# Patient Record
Sex: MALE | Race: WHITE | NOT HISPANIC OR LATINO | Employment: OTHER | URBAN - METROPOLITAN AREA
[De-identification: names, ages, dates, MRNs, and addresses within clinical notes are randomized per-mention and may not be internally consistent; named-entity substitution may affect disease eponyms.]

---

## 2017-01-03 ENCOUNTER — THERAPY VISIT (OUTPATIENT)
Dept: PHYSICAL THERAPY | Facility: CLINIC | Age: 72
End: 2017-01-03
Payer: COMMERCIAL

## 2017-01-03 DIAGNOSIS — G89.29 CHRONIC PAIN OF BOTH SHOULDERS: ICD-10-CM

## 2017-01-03 DIAGNOSIS — M25.511 CHRONIC PAIN OF BOTH SHOULDERS: ICD-10-CM

## 2017-01-03 DIAGNOSIS — M54.2 NECK PAIN: Primary | ICD-10-CM

## 2017-01-03 DIAGNOSIS — M25.512 CHRONIC PAIN OF BOTH SHOULDERS: ICD-10-CM

## 2017-01-03 PROCEDURE — 97110 THERAPEUTIC EXERCISES: CPT | Mod: GP | Performed by: PHYSICAL THERAPIST

## 2017-01-03 PROCEDURE — 97112 NEUROMUSCULAR REEDUCATION: CPT | Mod: GP | Performed by: PHYSICAL THERAPIST

## 2017-01-11 ENCOUNTER — THERAPY VISIT (OUTPATIENT)
Dept: PHYSICAL THERAPY | Facility: CLINIC | Age: 72
End: 2017-01-11
Payer: COMMERCIAL

## 2017-01-11 DIAGNOSIS — M54.2 NECK PAIN: Primary | ICD-10-CM

## 2017-01-11 DIAGNOSIS — G89.29 CHRONIC PAIN OF BOTH SHOULDERS: ICD-10-CM

## 2017-01-11 DIAGNOSIS — M25.511 CHRONIC PAIN OF BOTH SHOULDERS: ICD-10-CM

## 2017-01-11 DIAGNOSIS — M25.512 CHRONIC PAIN OF BOTH SHOULDERS: ICD-10-CM

## 2017-01-11 PROCEDURE — 97112 NEUROMUSCULAR REEDUCATION: CPT | Mod: GP | Performed by: PHYSICAL THERAPIST

## 2017-01-11 PROCEDURE — 97110 THERAPEUTIC EXERCISES: CPT | Mod: GP | Performed by: PHYSICAL THERAPIST

## 2017-01-18 ENCOUNTER — THERAPY VISIT (OUTPATIENT)
Dept: PHYSICAL THERAPY | Facility: CLINIC | Age: 72
End: 2017-01-18
Payer: COMMERCIAL

## 2017-01-18 DIAGNOSIS — M54.2 NECK PAIN: Primary | ICD-10-CM

## 2017-01-18 DIAGNOSIS — M25.511 CHRONIC PAIN OF BOTH SHOULDERS: ICD-10-CM

## 2017-01-18 DIAGNOSIS — G89.29 CHRONIC PAIN OF BOTH SHOULDERS: ICD-10-CM

## 2017-01-18 DIAGNOSIS — M25.512 CHRONIC PAIN OF BOTH SHOULDERS: ICD-10-CM

## 2017-01-18 PROCEDURE — 97110 THERAPEUTIC EXERCISES: CPT | Mod: GP | Performed by: PHYSICAL THERAPIST

## 2017-01-18 PROCEDURE — 97112 NEUROMUSCULAR REEDUCATION: CPT | Mod: GP | Performed by: PHYSICAL THERAPIST

## 2017-05-09 NOTE — PROGRESS NOTES
James did not return to PT for additional follow up visits.  Current status is unknown.  Discharge at this time.

## 2017-09-14 ENCOUNTER — TRANSFERRED RECORDS (OUTPATIENT)
Dept: PHYSICAL THERAPY | Facility: CLINIC | Age: 72
End: 2017-09-14

## 2017-09-20 ENCOUNTER — THERAPY VISIT (OUTPATIENT)
Dept: PHYSICAL THERAPY | Facility: CLINIC | Age: 72
End: 2017-09-20
Payer: COMMERCIAL

## 2017-09-20 DIAGNOSIS — G89.29 CHRONIC RIGHT SHOULDER PAIN: Primary | ICD-10-CM

## 2017-09-20 DIAGNOSIS — M25.511 CHRONIC RIGHT SHOULDER PAIN: Primary | ICD-10-CM

## 2017-09-20 PROCEDURE — 97112 NEUROMUSCULAR REEDUCATION: CPT | Mod: GP | Performed by: PHYSICAL THERAPIST

## 2017-09-20 PROCEDURE — 97161 PT EVAL LOW COMPLEX 20 MIN: CPT | Mod: GP | Performed by: PHYSICAL THERAPIST

## 2017-09-20 PROCEDURE — 97110 THERAPEUTIC EXERCISES: CPT | Mod: GP | Performed by: PHYSICAL THERAPIST

## 2017-09-20 PROCEDURE — G8984 CARRY CURRENT STATUS: HCPCS | Mod: GP | Performed by: PHYSICAL THERAPIST

## 2017-09-20 PROCEDURE — G8985 CARRY GOAL STATUS: HCPCS | Mod: GP | Performed by: PHYSICAL THERAPIST

## 2017-09-20 NOTE — MR AVS SNAPSHOT
After Visit Summary   9/20/2017    James Silvestre    MRN: [id number]           Patient Information     Date Of Birth          1945        Visit Information        Provider Department      9/20/2017 9:30 AM Rajni Ha, PT [facility]        Today's Diagnoses     Chronic right shoulder pain    -  1       Follow-ups after your visit        Your next 10 appointments already scheduled     Sep 26, 2017 10:20 AM CDT   CELY Extremity with Rajni Ha PT   [facility] ([facility])    [address]  [facility] [address]   [phone]            Oct 03, 2017 10:20 AM CDT   CELY Extremity with Rajni Ha PT   [facility] ([facility])    [address]  [facility] [address]   [phone]            Oct 10, 2017 10:20 AM CDT   CELY Extremity with Rajni Ha PT   [facility] ([facility])    [address]  [facility] [address]   [phone]              Who to contact     If you have questions or need follow up information about today's clinic visit or your schedule please contact [facility] directly at [phone].  Normal or non-critical lab and imaging results will be communicated to you by MyChart, letter or phone within 4 business days after the clinic has received the results. If you do not hear from us within 7 days, please contact the clinic through MyChart or phone. If you have a critical or abnormal lab result, we will notify you by phone as soon as possible.  Submit refill requests through [employer] or call your pharmacy and they will forward the refill request to us. Please allow 3 business days for your refill to be completed.          Additional Information About Your Visit       "  MyChart Information     [employer] lets you send messages to your doctor, view your test results, renew your prescriptions, schedule appointments and more. To sign up, go to [email or web address] . Click on \"Log in\" on the left side of the screen, which will take you to the Welcome page. Then click on \"Sign up Now\" on the right side of the page.     You will be asked to enter the access code listed below, as well as some personal information. Please follow the directions to create your username and password.     Your access code is: [email or web address]  Expires: 2017 11:42 AM     Your access code will  in 90 days. If you need help or a new code, please call your [place] clinic or [phone].        Care EveryWhere ID     This is your Care EveryWhere ID. This could be used by other organizations to access your [place] medical records  [id number]         Blood Pressure from Last 3 Encounters:   No data found for BP    Weight from Last 3 Encounters:   No data found for Wt              We Performed the Following     HC PT EVAL, LOW COMPLEXITY     CELY INITIAL EVAL REPORT     NEUROMUSCULAR RE-EDUCATION     THERAPEUTIC EXERCISES        Primary Care Provider    None Specified       No primary provider on file.        Equal Access to Services     CHRISTIANO WHITNEY : Gabriella West, cathy chaidez, adelfo thacker, roberto crooks. So [facility] [phone].    ATENCIÓN: Si habla español, tiene a greene disposición servicios gratuitos de asistencia lingüística. Llame al [phone].    We comply with applicable federal civil rights laws and [place] laws. We do not discriminate on the basis of race, color, national origin, age, disability sex, sexual orientation or gender identity.            Thank you!     Thank you for choosing [facility]  for your care. Our goal is always to provide you with excellent care. Hearing back " from our patients is one way we can continue to improve our services. Please take a few minutes to complete the written survey that you may receive in the mail after your visit with us. Thank you!             Your Updated Medication List - Protect others around you: Learn how to safely use, store and throw away your medicines at www.disposemymeds.org.      Notice  As of 9/20/2017 11:42 AM    You have not been prescribed any medications.

## 2017-09-20 NOTE — LETTER
[facility]IC [facility] PHYSICAL THERAPY  [address]  [facility] [id number] [phone]    2017    Re: James Silvestre   :   1945  MRN:  [id number]   REFERRING PHYSICIAN:   David Watts    [facility]  Date of Initial Evaluation:  2017  Visits:  Rxs Used: 1  Reason for Referral:  Chronic right shoulder pain  EVALUATION SUMMARY  [facility] Initial Evaluation  Subjective:  Patient is a 72 year old male presenting with rehab right shoulder hpi. The history is provided by the patient. No  was used.   James Silvestre is a 72 year old male with a right shoulder condition.  Condition occurred with:  Unknown cause.  Condition occurred: for unknown reasons.  This is a chronic condition  James saw Dr. Watts on 17 with c/o R shoulder pain.  He was referred to PT for evaluation and treatment.  James reports that his R shoulder has been bothersome for about 8 months, reports having a pinched nerve in his neck a few months ago which resolved with rowing, treadmill walking and weight lifting.  Pain is currently in upper shoulder and back side of shoulder.  Chief complaints: pain with side sleeping, cannot sleep on R shoulder, pain with reaching, difficulty with putting something on a high shelf, and pushing as to get himself up from chair.  Pt also reports that his thumb and first 2 fingers are going numb, reports B carpal tunnel.  Pt walks with cane in R hand.  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  Pt lost his wife in 2017 and decreased activity following her passing.  .    Patient reports pain:  Posterior, upper trap and scapular area.  Radiates to:  Cervical.  Pain is described as aching and is intermittent and reported as 8/10 (0-8/10).  Associated symptoms:  Loss of strength and painful arc (reaching out to side feels weak). Worse during:  activity-position dependent.  Symptoms are exacerbated by lying on extremity, certain positions, using arm at shoulder level and using arm overhead and relieved by nothing.  Since onset symptoms are gradually worsening.          Pertinent medical history includes:  High blood pressure, sleep disorder/apnea and overweight (pain at rest/night, numbness and tingling).  Medical allergies: no.  Other surgeries include:  None reported.  Current medications:  Heparin/coumadin.  Current occupation is retired.    Primary job tasks include:  Prolonged sitting, operating a machine and lifting (computer work, driving, carrying, pushing/pulling).  Barriers include:  None as reported by the patient.  Red flags:  Bilateral numbness and pain at rest/night.  Objective:  Standing Alignment:    Cervical/Thoracic:  Forward head and thoracic kyphosis increased  Shoulder/UE:  Rounded shoulders  Flexibility/Screens:   Positive screens:  Cervical (decreased R shoulder pain with correction of spine posture)         Shoulder Evaluation:  ROM:  AROM:    Flexion:  Left:  170    Right:  155  Extension: Left: 45Right: 35  Abduction:  Left: 170   Right:  168  External Rotation:  Left:  55    Right:  30  Extension/Internal Rotation:  Left:  T8    Right:  T11    PROM:    Flexion:  Left:  175    Right: 170    Abduction:  Left:  175    Right:  170  Strength:    Flexion: Right:  5/5  Strong/painful     Pain:  +  Extension:  Right: 5/5    Strong/pain free  Pain:  Abduction:  Right: 5/5    Strong/painful    Pain:+  Adduction:  Right: 5/5   Strong/pain free    Pain:  Internal Rotation:  Right: 4/5    Strong/painful    Pain:+++  Elbow Flexion:  Right:5/5   Strong/pain free    Pain:  Elbow Extension:  Right:5/5   Strong/pain free    Pain:  Special Tests:    Right shoulder positive for the following special tests:Impingement (+ Neer, + H-K, + crossover)  Right shoulder negative for the following special tests:Rotator cuff tear  Palpation:    Right shoulder  tenderness present at: Levator and Upper Trap  Right shoulder tenderness not present at:Supraspinatus; Infraspinatus; Teres Minor; Subscapularis or Bicipital Groove       Following repeated passive shoulder extension with OP, pt's painful arc of motion was abolished and ERP reduced from 7/10 to 3/10.  Pt educated on regular performance (10 reps every 2 hours), posture and scapular positioning.  Cervical screen positive in reproduction of upper trap and scapular pain, will contact Dr. Watts for cervical spine orders as necessary.  Also decreased James's cane height as it was 3 notches too tall (above distal wrist crease), decreased shoulder elevation with cane ambulation.    Assessment/Plan:    Patient is a 72 year old male with right side shoulder complaints.    Patient has the following significant findings with corresponding treatment plan.                Diagnosis 1:  R shoulder pain  Pain -  hot/cold therapy, manual therapy, self management, education, directional preference exercise and home program  Decreased ROM/flexibility - manual therapy, therapeutic exercise and home program  Decreased strength - therapeutic exercise, therapeutic activities and home program  Impaired muscle performance - neuro re-education and home program  Decreased function - therapeutic activities and home program  Impaired posture - neuro re-education and home program  Therapy Evaluation Codes:   1) History comprised of:   Personal factors that impact the plan of care:      None.    Comorbidity factors that impact the plan of care are:      None.     Medications impacting care: None.  2) Examination of Body Systems comprised of:   Body structures and functions that impact the plan of care:      Cervical spine and Shoulder.   Activity limitations that impact the plan of care are:      Driving, Dressing, Grasping, Lifting, Sleeping, Laying down and reaching, pushing down.  3) Clinical presentation characteristics  are:   Stable/Uncomplicated.  4) Decision-Making    Low complexity using standardized patient assessment instrument and/or measureable assessment of functional outcome.  Cumulative Therapy Evaluation is: Low complexity.  Previous and current functional limitations:  (See Goal Flow Sheet for this information)    Short term and Long term goals: (See Goal Flow Sheet for this information)   Communication ability:  Patient appears to be able to clearly communicate and understand verbal and written communication and follow directions correctly.  Treatment Explanation - The following has been discussed with the patient:   RX ordered/plan of care  Anticipated outcomes  Possible risks and side effects  This patient would benefit from PT intervention to resume normal activities.   Rehab potential is excellent.  Frequency:  1 X week, once daily  Duration:  for 9 weeks  Discharge Plan:  Achieve all LTG.  Independent in home treatment program.  Reach maximal therapeutic benefit.    Thank you for your referral.    INQUIRIES  Therapist: Rajni Ha DPT  [facility]  [address] [place] [address]. [address]  [facility] [address]  Phone: [phone]  Fax: [phone]

## 2017-09-20 NOTE — PROGRESS NOTES
[facility] Initial Evaluation    Subjective:    Patient is a 72 year old male presenting with rehab right shoulder hpi. The history is provided by the patient. No  was used.   James Silvestre is a 72 year old male with a right shoulder condition.  Condition occurred with:  Unknown cause.  Condition occurred: for unknown reasons.  This is a chronic condition  James saw Dr. Watts on 9/13/17 with c/o R shoulder pain.  He was referred to PT for evaluation and treatment.  James reports that his R shoulder has been bothersome for about 8 months, reports having a pinched nerve in his neck a few months ago which resolved with rowing, treadmill walking and weight lifting.  Pain is currently in upper shoulder and back side of shoulder.  Chief complaints: pain with side sleeping, cannot sleep on R shoulder, pain with reaching, difficulty with putting something on a high shelf, and pushing as to get himself up from chair.  Pt also reports that his thumb and first 2 fingers are going numb, reports B carpal tunnel.  Pt walks with cane in R hand.  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  Pt lost his wife in Feb 2017 and decreased activity following her passing.  .    Patient reports pain:  Posterior, upper trap and scapular area.  Radiates to:  Cervical.  Pain is described as aching and is intermittent and reported as 8/10 (0-8/10).  Associated symptoms:  Loss of strength and painful arc (reaching out to side feels weak). Worse during: activity-position dependent.  Symptoms are exacerbated by lying on extremity, certain positions, using arm at shoulder level and using arm overhead and relieved by nothing.  Since onset symptoms are gradually worsening.          Pertinent medical history includes:  High blood pressure, sleep disorder/apnea and overweight (pain at rest/night, numbness and tingling).  Medical allergies: no.  Other surgeries include:  None reported.  Current  medications:  Heparin/coumadin.  Current occupation is retired.    Primary job tasks include:  Prolonged sitting, operating a machine and lifting (computer work, driving, carrying, pushing/pulling).    Barriers include:  None as reported by the patient.    Red flags:  Bilateral numbness and pain at rest/night.                        Objective:    Standing Alignment:    Cervical/Thoracic:  Forward head and thoracic kyphosis increased  Shoulder/UE:  Rounded shoulders                  Flexibility/Screens:   Positive screens:  Cervical (decreased R shoulder pain with correction of spine posture)                             Shoulder Evaluation:  ROM:  AROM:    Flexion:  Left:  170    Right:  155  Extension: Left: 45Right: 35  Abduction:  Left: 170   Right:  168      External Rotation:  Left:  55    Right:  30            Extension/Internal Rotation:  Left:  T8    Right:  T11    PROM:    Flexion:  Left:  175    Right: 170      Abduction:  Left:  175    Right:  170                          Strength:    Flexion: Right:  5/5  Strong/painful     Pain:  +  Extension:  Right: 5/5    Strong/pain free  Pain:  Abduction:  Right: 5/5    Strong/painful    Pain:+  Adduction:  Right: 5/5   Strong/pain free    Pain:  Internal Rotation:  Right: 4/5    Strong/painful    Pain:+++        Elbow Flexion:  Right:5/5   Strong/pain free    Pain:  Elbow Extension:  Right:5/5   Strong/pain free    Pain:    Special Tests:      Right shoulder positive for the following special tests:Impingement (+ Neer, + H-K, + crossover)  Right shoulder negative for the following special tests:Rotator cuff tear  Palpation:      Right shoulder tenderness present at: Levator and Upper Trap  Right shoulder tenderness not present at:Supraspinatus; Infraspinatus; Teres Minor; Subscapularis or Bicipital Groove                                   Following repeated passive shoulder extension with OP, pt's painful arc of motion was abolished and ERP reduced from 7/10 to  3/10.  Pt educated on regular performance (10 reps every 2 hours), posture and scapular positioning.  Cervical screen positive in reproduction of upper trap and scapular pain, will contact Dr. Watts for cervical spine orders as necessary.  Also decreased James's cane height as it was 3 notches too tall (above distal wrist crease), decreased shoulder elevation with cane ambulation.      General     ROS    Assessment/Plan:      Patient is a 72 year old male with right side shoulder complaints.    Patient has the following significant findings with corresponding treatment plan.                Diagnosis 1:  R shoulder pain  Pain -  hot/cold therapy, manual therapy, self management, education, directional preference exercise and home program  Decreased ROM/flexibility - manual therapy, therapeutic exercise and home program  Decreased strength - therapeutic exercise, therapeutic activities and home program  Impaired muscle performance - neuro re-education and home program  Decreased function - therapeutic activities and home program  Impaired posture - neuro re-education and home program    Therapy Evaluation Codes:   1) History comprised of:   Personal factors that impact the plan of care:      None.    Comorbidity factors that impact the plan of care are:      None.     Medications impacting care: None.  2) Examination of Body Systems comprised of:   Body structures and functions that impact the plan of care:      Cervical spine and Shoulder.   Activity limitations that impact the plan of care are:      Driving, Dressing, Grasping, Lifting, Sleeping, Laying down and reaching, pushing down.  3) Clinical presentation characteristics are:   Stable/Uncomplicated.  4) Decision-Making    Low complexity using standardized patient assessment instrument and/or measureable assessment of functional outcome.  Cumulative Therapy Evaluation is: Low complexity.    Previous and current functional limitations:  (See Goal Flow Sheet for  this information)    Short term and Long term goals: (See Goal Flow Sheet for this information)     Communication ability:  Patient appears to be able to clearly communicate and understand verbal and written communication and follow directions correctly.  Treatment Explanation - The following has been discussed with the patient:   RX ordered/plan of care  Anticipated outcomes  Possible risks and side effects  This patient would benefit from PT intervention to resume normal activities.   Rehab potential is excellent.    Frequency:  1 X week, once daily  Duration:  for 9 weeks  Discharge Plan:  Achieve all LTG.  Independent in home treatment program.  Reach maximal therapeutic benefit.    Please refer to the daily flowsheet for treatment today, total treatment time and time spent performing 1:1 timed codes.

## 2017-09-26 ENCOUNTER — THERAPY VISIT (OUTPATIENT)
Dept: PHYSICAL THERAPY | Facility: CLINIC | Age: 72
End: 2017-09-26
Payer: COMMERCIAL

## 2017-09-26 DIAGNOSIS — G89.29 CHRONIC RIGHT SHOULDER PAIN: ICD-10-CM

## 2017-09-26 DIAGNOSIS — M25.511 CHRONIC RIGHT SHOULDER PAIN: ICD-10-CM

## 2017-09-26 PROCEDURE — 97110 THERAPEUTIC EXERCISES: CPT | Mod: GP | Performed by: PHYSICAL THERAPIST

## 2017-09-26 PROCEDURE — 97140 MANUAL THERAPY 1/> REGIONS: CPT | Mod: GP | Performed by: PHYSICAL THERAPIST

## 2017-10-03 ENCOUNTER — THERAPY VISIT (OUTPATIENT)
Dept: PHYSICAL THERAPY | Facility: CLINIC | Age: 72
End: 2017-10-03
Payer: COMMERCIAL

## 2017-10-03 DIAGNOSIS — M25.511 CHRONIC RIGHT SHOULDER PAIN: ICD-10-CM

## 2017-10-03 DIAGNOSIS — G89.29 CHRONIC RIGHT SHOULDER PAIN: ICD-10-CM

## 2017-10-03 PROCEDURE — 97140 MANUAL THERAPY 1/> REGIONS: CPT | Mod: GP | Performed by: PHYSICAL THERAPIST

## 2017-10-03 PROCEDURE — 97110 THERAPEUTIC EXERCISES: CPT | Mod: GP | Performed by: PHYSICAL THERAPIST

## 2017-10-10 ENCOUNTER — THERAPY VISIT (OUTPATIENT)
Dept: PHYSICAL THERAPY | Facility: CLINIC | Age: 72
End: 2017-10-10
Payer: COMMERCIAL

## 2017-10-10 DIAGNOSIS — G89.29 CHRONIC RIGHT SHOULDER PAIN: ICD-10-CM

## 2017-10-10 DIAGNOSIS — M25.511 CHRONIC RIGHT SHOULDER PAIN: ICD-10-CM

## 2017-10-10 PROCEDURE — 97112 NEUROMUSCULAR REEDUCATION: CPT | Mod: GP | Performed by: PHYSICAL THERAPIST

## 2017-10-10 PROCEDURE — 97110 THERAPEUTIC EXERCISES: CPT | Mod: GP | Performed by: PHYSICAL THERAPIST

## 2017-10-17 ENCOUNTER — THERAPY VISIT (OUTPATIENT)
Dept: PHYSICAL THERAPY | Facility: CLINIC | Age: 72
End: 2017-10-17
Payer: COMMERCIAL

## 2017-10-17 DIAGNOSIS — G89.29 CHRONIC RIGHT SHOULDER PAIN: ICD-10-CM

## 2017-10-17 DIAGNOSIS — M25.511 CHRONIC RIGHT SHOULDER PAIN: ICD-10-CM

## 2017-10-17 PROCEDURE — 97530 THERAPEUTIC ACTIVITIES: CPT | Mod: GP | Performed by: PHYSICAL THERAPIST

## 2017-10-17 PROCEDURE — 97110 THERAPEUTIC EXERCISES: CPT | Mod: GP | Performed by: PHYSICAL THERAPIST

## 2017-10-31 ENCOUNTER — THERAPY VISIT (OUTPATIENT)
Dept: PHYSICAL THERAPY | Facility: CLINIC | Age: 72
End: 2017-10-31
Payer: COMMERCIAL

## 2017-10-31 DIAGNOSIS — G89.29 CHRONIC RIGHT SHOULDER PAIN: ICD-10-CM

## 2017-10-31 DIAGNOSIS — M25.511 CHRONIC RIGHT SHOULDER PAIN: ICD-10-CM

## 2017-10-31 PROCEDURE — 97110 THERAPEUTIC EXERCISES: CPT | Mod: GP | Performed by: PHYSICAL THERAPIST

## 2017-10-31 PROCEDURE — 97112 NEUROMUSCULAR REEDUCATION: CPT | Mod: GP | Performed by: PHYSICAL THERAPIST

## 2017-11-14 ENCOUNTER — THERAPY VISIT (OUTPATIENT)
Dept: PHYSICAL THERAPY | Facility: CLINIC | Age: 72
End: 2017-11-14
Payer: COMMERCIAL

## 2017-11-14 DIAGNOSIS — G89.29 CHRONIC RIGHT SHOULDER PAIN: ICD-10-CM

## 2017-11-14 DIAGNOSIS — M25.511 CHRONIC RIGHT SHOULDER PAIN: ICD-10-CM

## 2017-11-14 PROCEDURE — 97110 THERAPEUTIC EXERCISES: CPT | Mod: GP | Performed by: PHYSICAL THERAPIST

## 2017-11-14 PROCEDURE — 97112 NEUROMUSCULAR REEDUCATION: CPT | Mod: GP | Performed by: PHYSICAL THERAPIST

## 2017-11-21 ENCOUNTER — THERAPY VISIT (OUTPATIENT)
Dept: PHYSICAL THERAPY | Facility: CLINIC | Age: 72
End: 2017-11-21
Payer: COMMERCIAL

## 2017-11-21 DIAGNOSIS — M25.511 CHRONIC RIGHT SHOULDER PAIN: ICD-10-CM

## 2017-11-21 DIAGNOSIS — G89.29 CHRONIC RIGHT SHOULDER PAIN: ICD-10-CM

## 2017-11-21 PROCEDURE — 97112 NEUROMUSCULAR REEDUCATION: CPT | Mod: GP | Performed by: PHYSICAL THERAPIST

## 2017-11-21 PROCEDURE — 97110 THERAPEUTIC EXERCISES: CPT | Mod: GP | Performed by: PHYSICAL THERAPIST

## 2017-12-05 ENCOUNTER — THERAPY VISIT (OUTPATIENT)
Dept: PHYSICAL THERAPY | Facility: CLINIC | Age: 72
End: 2017-12-05
Payer: COMMERCIAL

## 2017-12-05 DIAGNOSIS — G89.29 CHRONIC RIGHT SHOULDER PAIN: ICD-10-CM

## 2017-12-05 DIAGNOSIS — M25.511 CHRONIC RIGHT SHOULDER PAIN: ICD-10-CM

## 2017-12-05 PROCEDURE — 97530 THERAPEUTIC ACTIVITIES: CPT | Mod: GP | Performed by: PHYSICAL THERAPIST

## 2017-12-05 PROCEDURE — 97112 NEUROMUSCULAR REEDUCATION: CPT | Mod: GP | Performed by: PHYSICAL THERAPIST

## 2017-12-05 PROCEDURE — G8985 CARRY GOAL STATUS: HCPCS | Mod: GP | Performed by: PHYSICAL THERAPIST

## 2017-12-05 PROCEDURE — 97110 THERAPEUTIC EXERCISES: CPT | Mod: GP | Performed by: PHYSICAL THERAPIST

## 2017-12-05 PROCEDURE — G8984 CARRY CURRENT STATUS: HCPCS | Mod: GP | Performed by: PHYSICAL THERAPIST

## 2017-12-05 NOTE — LETTER
[facility] ATHLETIC [facility] PHYSICAL THERAPY  [address]  [facility] [place] [facility] [address] [phone]    2017    Re: James Silvestre   :   1945  MRN:  [id number]   REFERRING PHYSICIAN:   David Watts    [facility]THLET[facility] PHYSICAL [facility]    Date of Initial Evaluation:  2017  Visits:  Rxs Used: 9  Reason for Referral:  Chronic right shoulder pain    PROGRESS  REPORT  Progress reporting period is from 17 to 17.       SUBJECTIVE  Subjective: James reports that his shoulder is better since starting therapy. However he continues to have the most pain at night when lying on his R shoulder. Does his neck exercises as well, 2-3x/day. Pain with reaching continues when reaching up/out to the side in certain positions. Trial of medication from family member abolished shoulder pain temporarily. L>R hand numbness and he has been noticing it more in the past week.  Numbness/tingling at night in hands despite wearing braces for carpal tunnel.  Shoulder blade pain is there all the time and began to improve with cervical ex but does not last.  Pt would like to return to MD as he is still having problems, would like to return to PT as directed.      Current pain level is 0/10 at rest, minimal pain with reaching overhead/out to side, however up to 8/10 with reaching behind back.     Initial Pain level:  (0-8/10).   Changes in function:  Yes (See Goal flowsheet attached for changes in current functional level)  Adverse reaction to treatment or activity: Increased pain with sleeping, reaching.     OBJECTIVE  Changes noted in objective findings:  Yes, please see below.   Objective: R shoulder AROM: flex 155, abd 160, ER 68, IR/Ext T10, ext 40.  Up to 8/10 pain with reaching behind back, signifiacntly less pain with reaching out to side and overhead (4/10).  R shoulder strength: 5/5 for flex, abd, add, ext, ER, IR; mild pain with resisted IR and abd.  Cervical AROM: flex full with reproduction of R shoulder blade pain, ext with mod loss and painfree, B lateral flexion with mod loss and painfree, B rotation with min loss and painfree. Reduced pain with reaching behind back (5/10) following repeated cervical retraction x10. Further reduction in pain and increased motion following shoulder extension exericses. At this time, James has a HEP that is complete with posture ex, shoulder mobility ex, scapular stability ex and RC strengthening ex. D/t presence of progressive hand sx and plateau in motion/pain relief, further MD evaluation (including cervical spine) is recommended prior to continuation of PT. Discussed ongoing POC with patient and he is in agreement and would like to discuss progress and plateau and neck/shoulder with Dr. Watts.     Re: James Silvestre   :   1945          ASSESSMENT/PLAN  Updated problem list and treatment plan: Diagnosis 1: R shoulder pain  Pain -  hot/cold therapy, manual therapy, self management, education, directional preference exercise and home program  Decreased ROM/flexibility - manual therapy, therapeutic exercise and home program  Impaired muscle performance - neuro re-education and home program  Decreased function - therapeutic activities and home program  Impaired posture - neuro re-education and home program  STG/LTGs have been met or progress has been made towards goals:  Yes (See Goal flow sheet completed today.)  Assessment of Progress: The patient's condition has potential to improve.  The patient's progress has plateaued.  Self Management Plans:  Patient has been instructed in a home treatment program.  Patient  has been instructed in self management of symptoms.  I have re-evaluated this patient and find that the nature, scope, duration and intensity of the therapy is appropriate for the medical condition of the patient.  James continues to require the following intervention to meet STG and LTG's:  Further evaluation  is recommended of cervical and shoulder region prior to continuation of PT.     Recommendations:  This patient would benefit from further evaluation.  Pt should return to PT as directed by MD following evaluation.      Thank you for your referral.        INQUIRIES  Therapist: Rajni Ha DPT  [facility] [facility] PHYSICAL THERAPY  [address] [place] [address]. [address]  [facility] [address]  Phone: [phone]  Fax: [phone]

## 2017-12-05 NOTE — PROGRESS NOTES
Subjective:    HPI                    Objective:    System    Physical Exam    General     ROS    Assessment/Plan:      PROGRESS  REPORT    Progress reporting period is from 9/20/17 to 12/5/17.       SUBJECTIVE  Subjective: James reports that his shoulder is better since starting therapy.  However he continues to have the most pain at night when lying on his R shoulder.  Does his neck exercises as well, 2-3x/day.  Pain with reaching continues when reaching up/out to the side in certain positions.  Trial of medication from family member abolished shoulder pain temporarily.  L>R hand numbness and he has been noticing it more in the past week.  Numbness/tingling at night in hands despite wearing braces for carpal tunnel.  Shoulder blade pain is there all the time and began to improve with cervical ex but does not last.  Pt would like to return to MD as he is still having problems, would like to return to PT as directed.      Current pain level is 0/10 at rest, minimal pain with reaching overhead/out to side, however up to 8/10 with reaching behind back.     Initial Pain level:  (0-8/10).   Changes in function:  Yes (See Goal flowsheet attached for changes in current functional level)  Adverse reaction to treatment or activity: Increased pain with sleeping, reaching.     OBJECTIVE  Changes noted in objective findings:  Yes, please see below.   Objective: R shoulder AROM: flex 155, abd 160, ER 68, IR/Ext T10, ext 40.  Up to 8/10 pain with reaching behind back, signifiacntly less pain with reaching out to side and overhead (4/10).  R shoulder strength: 5/5 for flex, abd, add, ext, ER, IR; mild pain with resisted IR and abd.  Cervical AROM: flex full with reproduction of R shoulder blade pain, ext with mod loss and painfree, B lateral flexion with mod loss and painfree, B rotation with min loss and painfree.  Reduced pain with reaching behind back (5/10) following repeated cervical retraction x10.  Further reduction in pain  and increased motion following shoulder extension exericses.  At this time, James has a HEP that is complete with posture ex, shoulder mobility ex, scapular stability ex and RC strengthening ex.  D/t presence of progressive hand sx and plateau in motion/pain relief, further MD evaluation (including cervical spine) is recommended prior to continuation of PT.  Discussed ongoing POC with patient and he is in agreement and would like to discuss progress and plateau and neck/shoulder with Dr. Watts.       ASSESSMENT/PLAN  Updated problem list and treatment plan: Diagnosis 1: R shoulder pain  Pain -  hot/cold therapy, manual therapy, self management, education, directional preference exercise and home program  Decreased ROM/flexibility - manual therapy, therapeutic exercise and home program  Impaired muscle performance - neuro re-education and home program  Decreased function - therapeutic activities and home program  Impaired posture - neuro re-education and home program  STG/LTGs have been met or progress has been made towards goals:  Yes (See Goal flow sheet completed today.)  Assessment of Progress: The patient's condition has potential to improve.  The patient's progress has plateaued.  Self Management Plans:  Patient has been instructed in a home treatment program.  Patient  has been instructed in self management of symptoms.  I have re-evaluated this patient and find that the nature, scope, duration and intensity of the therapy is appropriate for the medical condition of the patient.  James continues to require the following intervention to meet STG and LTG's:  Further evaluation is recommended of cervical and shoulder region prior to continuation of PT.     Recommendations:  This patient would benefit from further evaluation.  Pt should return to PT as directed by MD following evaluation.      Please refer to the daily flowsheet for treatment today, total treatment time and time spent performing 1:1 timed  codes.

## 2017-12-05 NOTE — MR AVS SNAPSHOT
"              After Visit Summary   12/5/2017    James Silvestre    MRN: [id number]           Patient Information     Date Of Birth          1945        Visit Information        Provider Department      12/5/2017 10:20 AM Rajni Ha, PT [facility]        Today's Diagnoses     Chronic right shoulder pain           Follow-ups after your visit        Your next 10 appointments already scheduled     Dec 12, 2017 10:20 AM CST   CELY Extremity with Rajni Ha PT   [facility] ([facility])    [address]  [facility] [id number] [phone]            Dec 19, 2017 10:20 AM CST   CELY Extremity with Rajni Ha PT   [facility] ([facility])    [address]  [facility] [id number] [phone]              Who to contact     If you have questions or need follow up information about today's clinic visit or your schedule please contact [facility] directly at [phone].  Normal or non-critical lab and imaging results will be communicated to you by Verdezynehart, letter or phone within 4 business days after the clinic has received the results. If you do not hear from us within 7 days, please contact the clinic through Mogadt or phone. If you have a critical or abnormal lab result, we will notify you by phone as soon as possible.  Submit refill requests through Instinctiv or call your pharmacy and they will forward the refill request to us. Please allow 3 business days for your refill to be completed.          Additional Information About Your Visit        Verdezynehart Information     Instinctiv lets you send messages to your doctor, view your test results, renew your prescriptions, schedule appointments and more. To sign up, go to [email or web address] . Click on \"Log in\" on the left side " "of the screen, which will take you to the Welcome page. Then click on \"Sign up Now\" on the right side of the page.     You will be asked to enter the access code listed below, as well as some personal information. Please follow the directions to create your username and password.     Your access code is: [email or web address]  Expires: 2017 10:42 AM     Your access code will  in 90 days. If you need help or a new code, please call your [facility] clinic or [phone].        Care EveryWhere ID     This is your Care EveryWhere ID. This could be used by other organizations to access your [facility] medical records  [id number]         Blood Pressure from Last 3 Encounters:   No data found for BP    Weight from Last 3 Encounters:   No data found for Wt              We Performed the Following     CELY PROGRESS NOTES REPORT     NEUROMUSCULAR RE-EDUCATION     THERAPEUTIC ACTIVITIES     THERAPEUTIC EXERCISES        Primary Care Provider Office Phone # Fax #    David HEIN [facility] [phone]       [facility] [address] [facility] [address]  [facility] [address]        Equal Access to Services     [facility]: Hadii aad ku hadasho Soomaali, waaxda luqadaha, qaybta kaalmada adeegyaricardo, roberto ellington . So [facility] [phone].    ATENCIÓN: Si habla español, tiene a greene disposición servicios gratuitos de asistencia lingüística. Maximo al [phone].    We comply with applicable federal civil rights laws and [place] laws. We do not discriminate on the basis of race, color, national origin, age, disability, sex, sexual orientation, or gender identity.            Thank you!     Thank you for choosing [facility]  for your care. Our goal is always to provide you with excellent care. Hearing back from our patients is one way we can continue to improve our services. Please take a few minutes to complete the written survey that you may receive in the mail " after your visit with us. Thank you!             Your Updated Medication List - Protect others around you: Learn how to safely use, store and throw away your medicines at www.disposemymeds.org.      Notice  As of 12/5/2017 12:20 PM    You have not been prescribed any medications.

## 2018-01-12 PROBLEM — G89.29 CHRONIC RIGHT SHOULDER PAIN: Status: RESOLVED | Noted: 2017-09-20 | Resolved: 2018-01-12

## 2018-01-12 PROBLEM — M25.511 CHRONIC RIGHT SHOULDER PAIN: Status: RESOLVED | Noted: 2017-09-20 | Resolved: 2018-01-12

## 2018-01-12 NOTE — PROGRESS NOTES
James did not return to PT for additional follow up visits.  Status at PN serves as status at discharge- current status unknown.  Discharge at this time.

## 2019-04-26 ENCOUNTER — TRANSFERRED RECORDS (OUTPATIENT)
Dept: PHYSICAL THERAPY | Facility: CLINIC | Age: 74
End: 2019-04-26

## 2019-05-02 ENCOUNTER — THERAPY VISIT (OUTPATIENT)
Dept: PHYSICAL THERAPY | Facility: CLINIC | Age: 74
End: 2019-05-02
Payer: COMMERCIAL

## 2019-05-02 DIAGNOSIS — S76.311A HAMSTRING MUSCLE STRAIN, RIGHT, INITIAL ENCOUNTER: ICD-10-CM

## 2019-05-02 DIAGNOSIS — M54.16 LUMBAR RADICULOPATHY: ICD-10-CM

## 2019-05-02 PROCEDURE — 97110 THERAPEUTIC EXERCISES: CPT | Mod: GP | Performed by: PHYSICAL THERAPIST

## 2019-05-02 PROCEDURE — 97161 PT EVAL LOW COMPLEX 20 MIN: CPT | Mod: GP | Performed by: PHYSICAL THERAPIST

## 2019-05-02 NOTE — LETTER
[facility] PHYSICAL THERAP[facility]  [address]  [facility] [id number] [phone]    May 2, 2019    Re: James Silvestre   :   1945  MRN:  [id number]   REFERRING PHYSICIAN:   Yolanda Tobin    [facility]    Date of Initial Evaluation:  2019  Visits:  Rxs Used: 1  Reason for Referral:     Lumbar radiculopathy  Hamstring muscle strain, right, initial encounter    EVALUATION SUMMARY    [facility]    Subjective:  James saw Yolanda Tobin PA-C on 19 with c/o R thigh pain.  James reports that his pain started about 1 month ago when working in the garage.  He was doing some overhead work but intermittently kneeling- did not feel pain at the time, but later that day.  His hamstring continues to bother him and when he went went to see the doctor about something else, but he got out of car on 19, walked most of the way to the building and R leg gave out and he fell in the parking lot.  Was referred to PT for evaluation and treatment.  James reports that his pain is the worst after sitting when goes to stand up, gets better after some walking.  Has put heat on leg past couple of nights which feels better, he iced it for first 3 days which helped then too.  Feels that pain was initially worse up higher in his thigh and now the tightness has travelled down to the middle of the back of the thigh.      The history is provided by the patient. No  was used.   Affected Side: R thigh/hamstring. Condition occurred with:  Insidious onset. Condition occurred: for unknown reasons. This is a new condition     Site of Pain: back of R thigh. Radiates to:  Thigh.  Pain is described as aching and cramping and is intermittent and reported as 4/10.  Associated symptoms:  Buckling/giving out, loss of motion/stiffness and loss of strength. Pain is worse during the  day (activity-dependent).  Symptoms are exacerbated by descending stairs, ascending stairs, certain positions, standing, walking, transfers, bending/squatting and kneeling and relieved by heat, ice and rest.  Since onset symptoms are gradually improving. General health as reported by patient is good.  Pertinent medical history includes:  High blood pressure and overweight.  Medical allergies: no.  Other surgeries include:  None reported.  Current medications:  High blood pressure medication.  Current occupation is retired. 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  Barriers include:  None as reported by the patient.  Red flags:  None as reported by the patient.  Objective:  Gait:  Lacking L hip extension  Gait Type:  Antalgic   Assistive Devices:  Cane  Deviations:  Knee:  Knee flexion decr L       Knee Evaluation:  ROM:    Re: James Silvestre   :   1945      Strength:   Extension:  Right: 5/5   Pain:  Flexion:  Right: 5-/5    Pain:+    Palpation:    Right knee tenderness not present at:  Popliteal; Biceps Femoral and Semitendinosus    Curt Lumbar Evaluation  Posture:  Sitting: poor  Standing: poor  Lordosis: WNL  Lateral Shift: yes and right  Correction of Posture: better  Other Observations: Addition of lumbar roll in sitting decreased R HS pain.  L SGIS reproduced R HS pain.   Movement Loss:  Flexion (Flex): min  Extension (EXT): min  Side Glide R (SG R): min  Side Glide L (SG L): min and pain  Test Movements:  EIS: During: decreases  After: better  Mechanical Response: no effect  Repeat EIS: During: decreases  After: better  Mechanical Response: IncROM  Conclusion: derangement (hamstring pain with initial steps of walking reduced from 4/10 to 2/10 following lumbar repeated extension)  Principle of Treatment:  Posture Correction: lumbar roll in sitting  Extension: repeated standing lumbar extension x5-10, every 2 hours or as needed for relief  Other: pt education regarding  differential diagnosis between HS and lumbar spine as source of radiating thigh pain, ok level of activity, ongoing POC, quad weakness causing knee buckling rather than HS weakness  Musculoskeletal:        Legs:    Assessment/Plan:    Patient is a 74 year old male with lumbar and right hamsring complaints.    Patient has the following significant findings with corresponding treatment plan.                Diagnosis 1:  Lumbar radiculopathy, R hamstring pain  Pain -  hot/cold therapy, manual therapy, self management, education, directional preference exercise and home program  Decreased ROM/flexibility - manual therapy, therapeutic exercise and home program  Decreased joint mobility - manual therapy, therapeutic exercise and home program  Re: James Silvestre   :   1945    Decreased strength - therapeutic exercise, therapeutic activities and home program  Impaired balance - neuro re-education, therapeutic activities and home program  Impaired gait - gait training and home program  Impaired muscle performance - neuro re-education and home program  Decreased function - therapeutic activities and home program  Impaired posture - neuro re-education and home program    Therapy Evaluation Codes:   1) History comprised of:   Personal factors that impact the plan of care:      None.    Comorbidity factors that impact the plan of care are:      None.     Medications impacting care: None.  2) Examination of Body Systems comprised of:   Body structures and functions that impact the plan of care:      Lumbar spine and R hamstring.   Activity limitations that impact the plan of care are:      Driving, Sitting, Squatting/kneeling, Stairs, Standing and Walking.  3) Clinical presentation characteristics are:   Stable/Uncomplicated.  4) Decision-Making    Low complexity using standardized patient assessment instrument and/or measureable assessment of functional outcome.  Cumulative Therapy Evaluation is: Low complexity.  Previous  and current functional limitations:  (See Goal Flow Sheet for this information)    Short term and Long term goals: (See Goal Flow Sheet for this information)   Communication ability:  Patient appears to be able to clearly communicate and understand verbal and written communication and follow directions correctly.  Treatment Explanation - The following has been discussed with the patient: RX ordered/plan of care  Anticipated outcomes  Possible risks and side effects  This patient would benefit from PT intervention to resume normal activities.   Rehab potential is excellent.    Frequency:  1 X week, once daily  Duration:  for 6 weeks  Discharge Plan:  Achieve all LTG.  Independent in home treatment program.  Reach maximal therapeutic benefit.    Patient requests that this note be shared with his PCP, Dr. Watts.      Thank you for your referral.      Therapist: Rajni Ha DPT   [facility]  [address] [place] [address]. [address]  [facility] [address]  Phone: [phone]  Fax: [phone]

## 2019-05-02 NOTE — PROGRESS NOTES
[facility] Initial Evaluation  Subjective:  James saw Yolanda Tobin PA-C on 4/26/19 with c/o R thigh pain.  James reports that his pain started about 1 month ago when working in the garage.  He was doing some overhead work but intermittently kneeling- did not feel pain at the time, but later that day.  His hamstring continues to bother him and when he went went to see the doctor about something else, but he got out of car on 4/26/19, walked most of the way to the building and R leg gave out and he fell in the parking lot.  Was referred to PT for evaluation and treatment.  James reports that his pain is the worst after sitting when goes to stand up, gets better after some walking.  Has put heat on leg past couple of nights which feels better, he iced it for first 3 days which helped then too.  Feels that pain was initially worse up higher in his thigh and now the tightness has travelled down to the middle of the back of the thigh.        The history is provided by the patient. No  was used.   Affected Side: R thigh/hamstring.  Condition occurred with:  Insidious onset.  Condition occurred: for unknown reasons.  This is a new condition     Site of Pain: back of R thigh.  Radiates to:  Thigh.  Pain is described as aching and cramping and is intermittent and reported as 4/10.  Associated symptoms:  Buckling/giving out, loss of motion/stiffness and loss of strength. Pain is worse during the day (activity-dependent).  Symptoms are exacerbated by descending stairs, ascending stairs, certain positions, standing, walking, transfers, bending/squatting and kneeling and relieved by heat, ice and rest.  Since onset symptoms are gradually improving.        General health as reported by patient is good.  Pertinent medical history includes:  High blood pressure and overweight.  Medical allergies: no.  Other surgeries include:  None reported.  Current medications:  High blood pressure  medication.  Current occupation is retired.    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    Barriers include:  None as reported by the patient.    Red flags:  None as reported by the patient.                        Objective:    Gait:  Lacking L hip extension  Gait Type:  Antalgic   Assistive Devices:  Cane  Deviations:  Knee:  Knee flexion decr L                                                      Knee Evaluation:  ROM:            Strength:     Extension:  Right: 5/5   Pain:  Flexion:  Right: 5-/5    Pain:+            Palpation:        Right knee tenderness not present at:  Popliteal; Biceps Femoral and Semitendinosus              Curt Lumbar Evaluation    Posture:  Sitting: poor  Standing: poor  Lordosis: WNL  Lateral Shift: yes and right  Correction of Posture: better  Other Observations: Addition of lumbar roll in sitting decreased R HS pain.  L SGIS reproduced R HS pain.   Movement Loss:  Flexion (Flex): min  Extension (EXT): min  Side Glide R (SG R): min  Side Glide L (SG L): min and pain  Test Movements:    EIS: During: decreases  After: better  Mechanical Response: no effect  Repeat EIS: During: decreases  After: better  Mechanical Response: IncROM            Conclusion: derangement (hamstring pain with initial steps of walking reduced from 4/10 to 2/10 following lumbar repeated extension)  Principle of Treatment:  Posture Correction: lumbar roll in sitting    Extension: repeated standing lumbar extension x5-10, every 2 hours or as needed for relief    Other: pt education regarding differential diagnosis between HS and lumbar spine as source of radiating thigh pain, ok level of activity, ongoing POC, quad weakness causing knee buckling rather than HS weakness                                   Musculoskeletal:        Legs:      ROS    Assessment/Plan:    Patient is a 74 year old male with lumbar and right hamsring complaints.    Patient has the following significant findings with  corresponding treatment plan.                Diagnosis 1:  Lumbar radiculopathy, R hamstring pain  Pain -  hot/cold therapy, manual therapy, self management, education, directional preference exercise and home program  Decreased ROM/flexibility - manual therapy, therapeutic exercise and home program  Decreased joint mobility - manual therapy, therapeutic exercise and home program  Decreased strength - therapeutic exercise, therapeutic activities and home program  Impaired balance - neuro re-education, therapeutic activities and home program  Impaired gait - gait training and home program  Impaired muscle performance - neuro re-education and home program  Decreased function - therapeutic activities and home program  Impaired posture - neuro re-education and home program    Therapy Evaluation Codes:   1) History comprised of:   Personal factors that impact the plan of care:      None.    Comorbidity factors that impact the plan of care are:      None.     Medications impacting care: None.  2) Examination of Body Systems comprised of:   Body structures and functions that impact the plan of care:      Lumbar spine and R hamstring.   Activity limitations that impact the plan of care are:      Driving, Sitting, Squatting/kneeling, Stairs, Standing and Walking.  3) Clinical presentation characteristics are:   Stable/Uncomplicated.  4) Decision-Making    Low complexity using standardized patient assessment instrument and/or measureable assessment of functional outcome.  Cumulative Therapy Evaluation is: Low complexity.    Previous and current functional limitations:  (See Goal Flow Sheet for this information)    Short term and Long term goals: (See Goal Flow Sheet for this information)     Communication ability:  Patient appears to be able to clearly communicate and understand verbal and written communication and follow directions correctly.  Treatment Explanation - The following has been discussed with the patient:   RX  ordered/plan of care  Anticipated outcomes  Possible risks and side effects  This patient would benefit from PT intervention to resume normal activities.   Rehab potential is excellent.    Frequency:  1 X week, once daily  Duration:  for 6 weeks  Discharge Plan:  Achieve all LTG.  Independent in home treatment program.  Reach maximal therapeutic benefit.    Please refer to the daily flowsheet for treatment today, total treatment time and time spent performing 1:1 timed codes.     Patient requests that this note be shared with his PCP, Dr. Watts.

## 2019-05-06 ENCOUNTER — TRANSFERRED RECORDS (OUTPATIENT)
Dept: PHYSICAL THERAPY | Facility: CLINIC | Age: 74
End: 2019-05-06

## 2019-05-09 ENCOUNTER — THERAPY VISIT (OUTPATIENT)
Dept: PHYSICAL THERAPY | Facility: CLINIC | Age: 74
End: 2019-05-09
Payer: COMMERCIAL

## 2019-05-09 DIAGNOSIS — S76.311A HAMSTRING MUSCLE STRAIN, RIGHT, INITIAL ENCOUNTER: ICD-10-CM

## 2019-05-09 DIAGNOSIS — M54.16 LUMBAR RADICULOPATHY: ICD-10-CM

## 2019-05-09 PROCEDURE — 97530 THERAPEUTIC ACTIVITIES: CPT | Mod: GP | Performed by: PHYSICAL THERAPIST

## 2019-05-09 PROCEDURE — 97110 THERAPEUTIC EXERCISES: CPT | Mod: GP | Performed by: PHYSICAL THERAPIST

## 2019-05-09 PROCEDURE — 97112 NEUROMUSCULAR REEDUCATION: CPT | Mod: GP | Performed by: PHYSICAL THERAPIST

## 2019-05-16 ENCOUNTER — THERAPY VISIT (OUTPATIENT)
Dept: PHYSICAL THERAPY | Facility: CLINIC | Age: 74
End: 2019-05-16
Payer: COMMERCIAL

## 2019-05-16 DIAGNOSIS — S76.311A HAMSTRING MUSCLE STRAIN, RIGHT, INITIAL ENCOUNTER: ICD-10-CM

## 2019-05-16 DIAGNOSIS — M54.16 LUMBAR RADICULOPATHY: ICD-10-CM

## 2019-05-16 PROCEDURE — 97112 NEUROMUSCULAR REEDUCATION: CPT | Mod: GP | Performed by: PHYSICAL THERAPIST

## 2019-05-16 PROCEDURE — 97110 THERAPEUTIC EXERCISES: CPT | Mod: GP | Performed by: PHYSICAL THERAPIST

## 2019-05-16 PROCEDURE — 97530 THERAPEUTIC ACTIVITIES: CPT | Mod: GP | Performed by: PHYSICAL THERAPIST

## 2019-07-05 ENCOUNTER — THERAPY VISIT (OUTPATIENT)
Dept: PHYSICAL THERAPY | Facility: CLINIC | Age: 74
End: 2019-07-05
Payer: COMMERCIAL

## 2019-07-05 DIAGNOSIS — M25.511 ACUTE PAIN OF RIGHT SHOULDER: ICD-10-CM

## 2019-07-05 DIAGNOSIS — Z47.89 AFTERCARE FOLLOWING SURGERY OF THE MUSCULOSKELETAL SYSTEM: ICD-10-CM

## 2019-07-05 PROCEDURE — 97530 THERAPEUTIC ACTIVITIES: CPT | Mod: GP | Performed by: PHYSICAL THERAPIST

## 2019-07-05 PROCEDURE — 97110 THERAPEUTIC EXERCISES: CPT | Mod: GP | Performed by: PHYSICAL THERAPIST

## 2019-07-05 PROCEDURE — 97161 PT EVAL LOW COMPLEX 20 MIN: CPT | Mod: GP | Performed by: PHYSICAL THERAPIST

## 2019-07-05 NOTE — PROGRESS NOTES
"[facility] Initial Evaluation  Subjective:  James underwent surgery for R shoulder arthroscopic RCR, SAD and DCE on 6/3/19 by Dr. Mccullough.  James was referred to PT for evaluation and treatment.  James reports that since his surgery his recover has been \"not too bad\", awoke in the night with his arm overhead (about 10 days ago) and had a lot of pain when he awoke but it calmed down over the day. Pt has been wearing his sling at home but not using it when he leaves his house because he must use cane in R hand for ambulation with L below the knee amputation (does not use can inside his house).   Pt also reports that he must use R arm to push a little when he gets out of a chair, but \"not much\". (At this point, PT reviewed protocol and current restrictions/sling wear and timeline for active use; pt verbalized understanding).  Chief complaints at this time: difficulty sleeping, unable ot use R arm for reaching, lifting.      The history is provided by the patient. No  was used.   James Silvestre being seen for R shoulder pain.   Problem began 6/3/2019 (surgery). Where condition occurred: for unknown reasons.Problem occurred: insideous onset  and reported as 3/10 on pain scale. General health as reported by patient is good. Pertinent medical history includes:  High blood pressure and overweight.  Medical allergies: none.  Surgeries include:  None.  Current medications:  High blood pressure medication.   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  Pain is described as aching and is intermittent. Pain is worse during the night. Since onset symptoms are gradually improving. Special tests:  MRI. Previous treatment includes surgery. There was moderate improvement following previous treatment.   Patient is retired.   Barriers include:  Lives alone (must use R-handed cane d/t L LE amputation).  Red flags:  None as reported by patient.  Type of problem:  " Right shoulder   Condition occurred with:  Unknown cause. This is a new condition    Patient reports pain:  Anterior and lateral. Radiates to: upper trap. Associated with: clicking. Symptoms are exacerbated by lying on extremity, carrying, certain positions, lifting, using arm at shoulder level, using arm behind back and using arm overhead (unable to perform AROM d/t post-op restrictions) and relieved by rest and bracing/immobilizing.                      Objective:  Standing Alignment:      Shoulder/UE:  Rounded shoulders and protracted scapula R                                       Shoulder Evaluation:  ROM:  AROM:  not assessed (strength not assessed d/t post-op restrictions)  Flexion:  Left:  150      Extension: Left: 50  Abduction:  Left: 158         External Rotation:  Left:  55                Extension/Internal Rotation:  Left:  T8        PROM:    Flexion:  Right: 120      Abduction:  Right:  60       External Rotation:  Right:  30                      Stability Testing:  not assessed      Special Tests:  not assessed      Palpation:  Palpation assessed shoulder: no ttp- inscisions well-healed without s/s of infection.      Mobility Tests:  not assessed                                                 General     ROS    Assessment/Plan:    Patient is a 74 year old male with right side shoulder complaints.    Patient has the following significant findings with corresponding treatment plan.                Diagnosis 1:  R shoulder pain, s/p R SAD, RCR, DCE  Pain -  hot/cold therapy, manual therapy, self management, education, directional preference exercise and home program  Decreased ROM/flexibility - manual therapy, therapeutic exercise and home program  Decreased joint mobility - manual therapy, therapeutic exercise and home program  Decreased strength - therapeutic exercise, therapeutic activities and home program  Impaired muscle performance - neuro re-education and home program  Decreased function -  therapeutic activities and home program  Impaired posture - neuro re-education and home program    Therapy Evaluation Codes:   1) History comprised of:   Personal factors that impact the plan of care:      None.    Comorbidity factors that impact the plan of care are:      L below the knee amputation, requiring use of cane in R hand.     Medications impacting care: None.  2) Examination of Body Systems comprised of:   Body structures and functions that impact the plan of care:      Shoulder.   Activity limitations that impact the plan of care are:      Bathing, Cooking, Driving, Dressing, Lifting, Working, Sleeping and reaching.  3) Clinical presentation characteristics are:   Stable/Uncomplicated.  4) Decision-Making    Low complexity using standardized patient assessment instrument and/or measureable assessment of functional outcome.  Cumulative Therapy Evaluation is: Low complexity.    Previous and current functional limitations:  (See Goal Flow Sheet for this information)    Short term and Long term goals: (See Goal Flow Sheet for this information)     Communication ability:  Patient appears to be able to clearly communicate and understand verbal and written communication and follow directions correctly.  Treatment Explanation - The following has been discussed with the patient:   RX ordered/plan of care  Anticipated outcomes  Possible risks and side effects  This patient would benefit from PT intervention to resume normal activities.   Rehab potential is excellent.    Frequency:  2 X week, once daily  Duration:  for 1 weeks, tapering to 1x/week for 4 weeks (6 visits per MD to start)  Discharge Plan:  Achieve all LTG.  Independent in home treatment program.  Reach maximal therapeutic benefit.    Please refer to the daily flowsheet for treatment today, total treatment time and time spent performing 1:1 timed codes.

## 2019-07-09 ENCOUNTER — THERAPY VISIT (OUTPATIENT)
Dept: PHYSICAL THERAPY | Facility: CLINIC | Age: 74
End: 2019-07-09
Payer: COMMERCIAL

## 2019-07-09 DIAGNOSIS — Z47.89 AFTERCARE FOLLOWING SURGERY OF THE MUSCULOSKELETAL SYSTEM: ICD-10-CM

## 2019-07-09 DIAGNOSIS — M25.511 ACUTE PAIN OF RIGHT SHOULDER: ICD-10-CM

## 2019-07-09 PROCEDURE — 97110 THERAPEUTIC EXERCISES: CPT | Mod: GP | Performed by: PHYSICAL THERAPIST

## 2019-07-22 ENCOUNTER — THERAPY VISIT (OUTPATIENT)
Dept: PHYSICAL THERAPY | Facility: CLINIC | Age: 74
End: 2019-07-22
Payer: COMMERCIAL

## 2019-07-22 DIAGNOSIS — Z47.89 AFTERCARE FOLLOWING SURGERY OF THE MUSCULOSKELETAL SYSTEM: ICD-10-CM

## 2019-07-22 DIAGNOSIS — M25.511 ACUTE PAIN OF RIGHT SHOULDER: ICD-10-CM

## 2019-07-22 PROCEDURE — 97110 THERAPEUTIC EXERCISES: CPT | Mod: GP | Performed by: PHYSICAL THERAPIST

## 2019-07-26 ENCOUNTER — THERAPY VISIT (OUTPATIENT)
Dept: PHYSICAL THERAPY | Facility: CLINIC | Age: 74
End: 2019-07-26
Payer: COMMERCIAL

## 2019-07-26 DIAGNOSIS — M25.511 ACUTE PAIN OF RIGHT SHOULDER: ICD-10-CM

## 2019-07-26 DIAGNOSIS — Z47.89 AFTERCARE FOLLOWING SURGERY OF THE MUSCULOSKELETAL SYSTEM: ICD-10-CM

## 2019-07-26 PROCEDURE — 97110 THERAPEUTIC EXERCISES: CPT | Mod: GP | Performed by: PHYSICAL THERAPIST

## 2019-07-30 ENCOUNTER — THERAPY VISIT (OUTPATIENT)
Dept: PHYSICAL THERAPY | Facility: CLINIC | Age: 74
End: 2019-07-30
Payer: COMMERCIAL

## 2019-07-30 DIAGNOSIS — M25.511 ACUTE PAIN OF RIGHT SHOULDER: ICD-10-CM

## 2019-07-30 DIAGNOSIS — Z47.89 AFTERCARE FOLLOWING SURGERY OF THE MUSCULOSKELETAL SYSTEM: ICD-10-CM

## 2019-07-30 PROCEDURE — 97110 THERAPEUTIC EXERCISES: CPT | Mod: GP | Performed by: PHYSICAL THERAPIST

## 2019-08-08 ENCOUNTER — TELEPHONE (OUTPATIENT)
Dept: PHYSICAL THERAPY | Facility: CLINIC | Age: 74
End: 2019-08-08

## 2019-08-08 ENCOUNTER — THERAPY VISIT (OUTPATIENT)
Dept: PHYSICAL THERAPY | Facility: CLINIC | Age: 74
End: 2019-08-08
Payer: COMMERCIAL

## 2019-08-08 DIAGNOSIS — M25.511 ACUTE PAIN OF RIGHT SHOULDER: ICD-10-CM

## 2019-08-08 DIAGNOSIS — Z47.89 AFTERCARE FOLLOWING SURGERY OF THE MUSCULOSKELETAL SYSTEM: ICD-10-CM

## 2019-08-08 PROCEDURE — 97110 THERAPEUTIC EXERCISES: CPT | Mod: GP | Performed by: PHYSICAL THERAPIST

## 2019-08-08 NOTE — TELEPHONE ENCOUNTER
Called to ask questions- awaiting return call.  1. Can we start strengthening before 12 weeks?  2. When to progress beyond 90 deg abd?  3. When can pt shoot his rifle for target practice?

## 2019-08-08 NOTE — LETTER
"[facility] PHYSICAL THERAPY  [address]  [place] [id number] [phone]    2019    Re: James Silvestre   :   1945  MRN:  [id number]   REFERRING PHYSICIAN:   Yolanda Tobin    [facility]    Date of Initial Evaluation:  19  Visits:  Rxs Used: 6  Reason for Referral:     Acute pain of right shoulder  Aftercare following surgery of the musculoskeletal system    PROGRESS  REPORT    Progress reporting period is from 19 to 19.       SUBJECTIVE   Subjective: James reports that he had a pretty active week, but did less of his PT exercise, but did not get any more pain.  Less busy now.  Pain only occus when reaching in certain directions or with lying on the shoulder- he is still not using it for lifting.  Wants to fire his rifle from a stand for target practice- wondering about \"kick\" to front of shoulder/chest.      Current Pain level: 0.5/10.     Initial Pain level: 3/10.   Changes in function:  Yes (See Goal flowsheet attached for changes in current functional level)  Adverse reaction to treatment or activity: None    OBJECTIVE  Changes noted in objective findings:  Yes, please see below.   Objective: James is doing quite well.  R shoulder AROM: flex 150, abd 90 (current protocol restriction, however pt able to move greater and must be remineded to avoid higher motion), ER 58 (standing) with mild ERP, ext 40, IR/Ext L4.  Supine flexion with wand= 160 + ERP.  James knows his current motions restrictions and abides by them most of the time.  During his recovery, he has required early use of his R shoulder d/t the necessity of walking with a cane in his R hand because of his L lower leg amputation.  At this time, we are awaiting the 12 week point in his protocol to begin formal strengthening, however PT will place a call to Dr. Mccullough to inquire about strenghtening starting next week and " well as firearm use for target practice.        ASSESSMENT/PLAN  Updated problem list and treatment plan: Diagnosis 1:  R shoulder pain, s/p  RCR, SAD, DCE  Pain -  hot/cold therapy, manual therapy, self management, education, directional preference exercise and home program  Decreased ROM/flexibility - manual therapy, therapeutic exercise and home program  Decreased joint mobility - manual therapy, therapeutic exercise and home program  Decreased strength - therapeutic exercise, therapeutic activities and home program  Impaired muscle performance - neuro re-education and home program  Decreased function - therapeutic activities and home program  STG/LTGs have been met or progress has been made towards goals:  Yes (See Goal flow sheet completed today.)  Assessment of Progress: The patient's condition is improving.  Patient is meeting short term goals and is progressing towards long term goals.  Self Management Plans:  Patient has been instructed in a home treatment program.  Patient  has been instructed in self management of symptoms.  I have re-evaluated this patient and find that the nature, scope, duration and intensity of the therapy is appropriate for the medical condition of the patient.  James continues to require the following intervention to meet STG and LTG's:  PT  Re: James Silvestre   :   1945        Recommendations:  This patient would benefit from continued therapy.     Frequency:  1 X week, once daily  Duration:  for 8 weeks    Thank you for your referral.      INQUIRIES  Therapist: Rajni Ha DPT   [facility] PHYSICAL THERAPY  [address] [place] [address]. [address]  [facility] [address]  Phone: [phone]  Fax: [phone]

## 2019-08-08 NOTE — PROGRESS NOTES
"Subjective:  HPI                    Objective:  System    Physical Exam    General     ROS    Assessment/Plan:    PROGRESS  REPORT    Progress reporting period is from 7/5/19 to 8/8/19.       SUBJECTIVE   Subjective: James reports that he had a pretty active week, but did less of his PT exercise, but did not get any more pain.  Less busy now.  Pain only occus when reaching in certain directions or with lying on the shoulder- he is still not using it for lifting.  Wants to fire his rifle from a stand for target practice- wondering about \"kick\" to front of shoulder/chest.      Current Pain level: 0.5/10.     Initial Pain level: 3/10.   Changes in function:  Yes (See Goal flowsheet attached for changes in current functional level)  Adverse reaction to treatment or activity: None    OBJECTIVE  Changes noted in objective findings:  Yes, please see below.   Objective: James is doing quite well.  R shoulder AROM: flex 150, abd 90 (current protocol restriction, however pt able to move greater and must be remineded to avoid higher motion), ER 58 (standing) with mild ERP, ext 40, IR/Ext L4.  Supine flexion with wand= 160 + ERP.  James knows his current motions restrictions and abides by them most of the time.  During his recovery, he has required early use of his R shoulder d/t the necessity of walking with a cane in his R hand because of his L lower leg amputation.  At this time, we are awaiting the 12 week point in his protocol to begin formal strengthening, however PT will place a call to Dr. Mccullough to inquire about strenghtening starting next week and well as firearm use for target practice.        ASSESSMENT/PLAN  Updated problem list and treatment plan: Diagnosis 1:  R shoulder pain, s/p  RCR, SAD, DCE  Pain -  hot/cold therapy, manual therapy, self management, education, directional preference exercise and home program  Decreased ROM/flexibility - manual therapy, therapeutic exercise and home program  Decreased joint " mobility - manual therapy, therapeutic exercise and home program  Decreased strength - therapeutic exercise, therapeutic activities and home program  Impaired muscle performance - neuro re-education and home program  Decreased function - therapeutic activities and home program  STG/LTGs have been met or progress has been made towards goals:  Yes (See Goal flow sheet completed today.)  Assessment of Progress: The patient's condition is improving.  Patient is meeting short term goals and is progressing towards long term goals.  Self Management Plans:  Patient has been instructed in a home treatment program.  Patient  has been instructed in self management of symptoms.  I have re-evaluated this patient and find that the nature, scope, duration and intensity of the therapy is appropriate for the medical condition of the patient.  James continues to require the following intervention to meet STG and LTG's:  PT    Recommendations:  This patient would benefit from continued therapy.     Frequency:  1 X week, once daily  Duration:  for 8 weeks        Please refer to the daily flowsheet for treatment today, total treatment time and time spent performing 1:1 timed codes.

## 2019-08-13 ENCOUNTER — THERAPY VISIT (OUTPATIENT)
Dept: PHYSICAL THERAPY | Facility: CLINIC | Age: 74
End: 2019-08-13
Payer: COMMERCIAL

## 2019-08-13 DIAGNOSIS — Z47.89 AFTERCARE FOLLOWING SURGERY OF THE MUSCULOSKELETAL SYSTEM: ICD-10-CM

## 2019-08-13 DIAGNOSIS — M25.511 ACUTE PAIN OF RIGHT SHOULDER: ICD-10-CM

## 2019-08-13 PROCEDURE — 97112 NEUROMUSCULAR REEDUCATION: CPT | Mod: GP | Performed by: PHYSICAL THERAPIST

## 2019-08-13 PROCEDURE — 97110 THERAPEUTIC EXERCISES: CPT | Mod: GP | Performed by: PHYSICAL THERAPIST

## 2019-08-20 ENCOUNTER — THERAPY VISIT (OUTPATIENT)
Dept: PHYSICAL THERAPY | Facility: CLINIC | Age: 74
End: 2019-08-20
Payer: COMMERCIAL

## 2019-08-20 DIAGNOSIS — Z47.89 AFTERCARE FOLLOWING SURGERY OF THE MUSCULOSKELETAL SYSTEM: ICD-10-CM

## 2019-08-20 DIAGNOSIS — M25.511 ACUTE PAIN OF RIGHT SHOULDER: ICD-10-CM

## 2019-08-20 PROCEDURE — 97112 NEUROMUSCULAR REEDUCATION: CPT | Mod: GP | Performed by: PHYSICAL THERAPIST

## 2019-08-20 PROCEDURE — 97110 THERAPEUTIC EXERCISES: CPT | Mod: GP | Performed by: PHYSICAL THERAPIST

## 2019-08-27 ENCOUNTER — THERAPY VISIT (OUTPATIENT)
Dept: PHYSICAL THERAPY | Facility: CLINIC | Age: 74
End: 2019-08-27
Payer: COMMERCIAL

## 2019-08-27 DIAGNOSIS — M25.511 ACUTE PAIN OF RIGHT SHOULDER: ICD-10-CM

## 2019-08-27 DIAGNOSIS — Z47.89 AFTERCARE FOLLOWING SURGERY OF THE MUSCULOSKELETAL SYSTEM: ICD-10-CM

## 2019-08-27 PROCEDURE — 97112 NEUROMUSCULAR REEDUCATION: CPT | Mod: GP | Performed by: PHYSICAL THERAPIST

## 2019-08-27 PROCEDURE — 97110 THERAPEUTIC EXERCISES: CPT | Mod: GP | Performed by: PHYSICAL THERAPIST

## 2019-09-10 ENCOUNTER — THERAPY VISIT (OUTPATIENT)
Dept: PHYSICAL THERAPY | Facility: CLINIC | Age: 74
End: 2019-09-10
Payer: COMMERCIAL

## 2019-09-10 DIAGNOSIS — Z47.89 AFTERCARE FOLLOWING SURGERY OF THE MUSCULOSKELETAL SYSTEM: ICD-10-CM

## 2019-09-10 DIAGNOSIS — M25.511 ACUTE PAIN OF RIGHT SHOULDER: ICD-10-CM

## 2019-09-10 PROCEDURE — 97112 NEUROMUSCULAR REEDUCATION: CPT | Mod: GP | Performed by: PHYSICAL THERAPIST

## 2019-09-10 PROCEDURE — 97110 THERAPEUTIC EXERCISES: CPT | Mod: GP | Performed by: PHYSICAL THERAPIST

## 2019-09-17 ENCOUNTER — THERAPY VISIT (OUTPATIENT)
Dept: PHYSICAL THERAPY | Facility: CLINIC | Age: 74
End: 2019-09-17
Payer: COMMERCIAL

## 2019-09-17 DIAGNOSIS — Z47.89 AFTERCARE FOLLOWING SURGERY OF THE MUSCULOSKELETAL SYSTEM: ICD-10-CM

## 2019-09-17 DIAGNOSIS — M25.511 ACUTE PAIN OF RIGHT SHOULDER: ICD-10-CM

## 2019-09-17 PROCEDURE — 97140 MANUAL THERAPY 1/> REGIONS: CPT | Mod: GP | Performed by: PHYSICAL THERAPIST

## 2019-09-17 PROCEDURE — 97112 NEUROMUSCULAR REEDUCATION: CPT | Mod: GP | Performed by: PHYSICAL THERAPIST

## 2019-09-17 PROCEDURE — 97110 THERAPEUTIC EXERCISES: CPT | Mod: GP | Performed by: PHYSICAL THERAPIST

## 2019-10-01 ENCOUNTER — THERAPY VISIT (OUTPATIENT)
Dept: PHYSICAL THERAPY | Facility: CLINIC | Age: 74
End: 2019-10-01
Payer: COMMERCIAL

## 2019-10-01 DIAGNOSIS — M25.511 ACUTE PAIN OF RIGHT SHOULDER: ICD-10-CM

## 2019-10-01 DIAGNOSIS — Z47.89 AFTERCARE FOLLOWING SURGERY OF THE MUSCULOSKELETAL SYSTEM: ICD-10-CM

## 2019-10-01 PROCEDURE — 97112 NEUROMUSCULAR REEDUCATION: CPT | Mod: GP | Performed by: PHYSICAL THERAPIST

## 2019-10-01 PROCEDURE — 97110 THERAPEUTIC EXERCISES: CPT | Mod: GP | Performed by: PHYSICAL THERAPIST

## 2019-10-29 ENCOUNTER — THERAPY VISIT (OUTPATIENT)
Dept: PHYSICAL THERAPY | Facility: CLINIC | Age: 74
End: 2019-10-29
Payer: COMMERCIAL

## 2019-10-29 DIAGNOSIS — Z47.89 AFTERCARE FOLLOWING SURGERY OF THE MUSCULOSKELETAL SYSTEM: ICD-10-CM

## 2019-10-29 DIAGNOSIS — M25.511 ACUTE PAIN OF RIGHT SHOULDER: ICD-10-CM

## 2019-10-29 PROCEDURE — 97110 THERAPEUTIC EXERCISES: CPT | Mod: GP | Performed by: PHYSICAL THERAPIST

## 2019-10-29 PROCEDURE — 97140 MANUAL THERAPY 1/> REGIONS: CPT | Mod: GP | Performed by: PHYSICAL THERAPIST

## 2019-10-29 NOTE — LETTER
[facility]  [address]  [facility] [id number] [phone]    2019    Re: James Silvestre   :   1945  MRN:  [id number]   REFERRING PHYSICIAN:   Yolanda Tobin    [facility]    Date of Initial Evaluation:  10/29/2019  Visits:  Rxs Used: 13  Reason for Referral:     Acute pain of right shoulder  Aftercare following surgery of the musculoskeletal system    DISCHARGE REPORT    Progress reporting period is from 19 to 10/29/19.       SUBJECTIVE  Subjective: James returns to PT today for folloup, has not been seen in 1 month.  He reports that after today, he is ready to progress on his own.  James reports that he has some discomfort but is doing everything he would like to do.  He is able to sleep on his R shoulder for 1-2 hours at a time without waking him up (he thinks).      Current pain level is 0.5/10.      Changes in function:  Yes (See Goal flowsheet attached for changes in current functional level)  Adverse reaction to treatment or activity: None    OBJECTIVE  Changes noted in objective findings:  Yes, please see below.   Objective: R shoulder AROM : flex 152 + discomfort at end range, abd 158, ER 55, ext 35, IR/Ext T10.5. R shoulder strength: 5/5 for flex, ext, abd, add, ER and IR.  Mild pain with resisted abduction and ER remains.  Mild pain with passive OP into flexion as well, abolished following gentle GH mobilizations into inferior glide.  At this time, James has been instructed to continue his 4-corner stretch into flexion 1-2x/day and to continue his RC strengthening 2x/week.       ASSESSMENT/PLAN  Updated problem list and treatment plan: Diagnosis 1:  R shoulder pain, s/p  RCR, SAD, DCE  Pain -  home program  Decreased ROM/flexibility - home program  STG/LTGs have been met or progress has been made towards goals:  Yes (See Goal flow sheet completed  today.)  Assessment of Progress: The patient's condition is improving.  The patient has met all of their long term goals.  Self Management Plans:  Patient has been instructed in a home treatment program.  Patient  has been instructed in self management of symptoms.  Re: James Silvestre   :   1945            I have re-evaluated this patient and find that the nature, scope, duration and intensity of the therapy is appropriate for the medical condition of the patient.  James continues to require the following intervention to meet STG and LTG's:  PT intervention is no longer required to meet STG/LTG.    Recommendations:  This patient is ready to be discharged from therapy and continue their home treatment program.    Thank you for your referral.        INQUIRIES  Therapist: Rajni Ha DPT  [facility] - [facility] PHYSICAL THERAPY  [address] [place] [address]. [address]  [facility] [address]  Phone: [phone]  Fax: [phone]

## 2019-10-29 NOTE — PROGRESS NOTES
Subjective:  HPI                    Objective:  System    Physical Exam    General     ROS    Assessment/Plan:    DISCHARGE REPORT    Progress reporting period is from 8/8/19 to 10/29/19.       SUBJECTIVE  Subjective: James returns to PT today for folloup, has not been seen in 1 month.  He reports that after today, he is ready to progress on his own.  James reports that he has some discomfort but is doing everything he would like to do.  He is able to sleep on his R shoulder for 1-2 hours at a time without waking him up (he thinks).      Current pain level is 0.5/10.      Changes in function:  Yes (See Goal flowsheet attached for changes in current functional level)  Adverse reaction to treatment or activity: None    OBJECTIVE  Changes noted in objective findings:  Yes, please see below.   Objective: R shoulder AROM : flex 152 + discomfort at end range, abd 158, ER 55, ext 35, IR/Ext T10.5. R shoulder strength: 5/5 for flex, ext, abd, add, ER and IR.  Mild pain with resisted abduction and ER remains.  Mild pain with passive OP into flexion as well, abolished following gentle GH mobilizations into inferior glide.  At this time, James has been instructed to continue his 4-corner stretch into flexion 1-2x/day and to continue his RC strengthening 2x/week.       ASSESSMENT/PLAN  Updated problem list and treatment plan: Diagnosis 1:  R shoulder pain, s/p  RCR, SAD, DCE  Pain -  home program  Decreased ROM/flexibility - home program  STG/LTGs have been met or progress has been made towards goals:  Yes (See Goal flow sheet completed today.)  Assessment of Progress: The patient's condition is improving.  The patient has met all of their long term goals.  Self Management Plans:  Patient has been instructed in a home treatment program.  Patient  has been instructed in self management of symptoms.  I have re-evaluated this patient and find that the nature, scope, duration and intensity of the therapy is appropriate for the medical  condition of the patient.  James continues to require the following intervention to meet STG and LTG's:  PT intervention is no longer required to meet STG/LTG.    Recommendations:  This patient is ready to be discharged from therapy and continue their home treatment program.    Please refer to the daily flowsheet for treatment today, total treatment time and time spent performing 1:1 timed codes.

## 2020-01-07 PROBLEM — M54.16 LUMBAR RADICULOPATHY: Status: RESOLVED | Noted: 2019-05-02 | Resolved: 2020-01-07

## 2020-01-07 PROBLEM — S76.311A HAMSTRING MUSCLE STRAIN, RIGHT, INITIAL ENCOUNTER: Status: RESOLVED | Noted: 2019-05-02 | Resolved: 2020-01-07

## 2020-01-07 NOTE — PROGRESS NOTES
Discharge Note    James failed to follow up and current status is unknown.  Please see information below for last relevant information on current status.  Patient seen for 3 visits.    SUBJECTIVE  Subjective changes noted by patient:  James reports that his pain has been pretty goo.  Roll behind his back works really well.  Slight irritation this morning from pulling garbage cans up the driveway.  No relief from hip stretch, some relief from standing up and leavning over sink.  R calf feels tight from time to time.  Would like to keep appt for next week and manybe cancel if still doing well.    .  Current pain level is 1.5/10.     Previous pain level was  4/10.   Changes in function:  Yes (See Goal flowsheet attached for changes in current functional level)  Adverse reaction to treatment or activity: None    OBJECTIVE  Changes noted in objective findings: Increase in R HS pain with standing hip flexor stretch (up to 2/10), reduced to 0.5/10 following rep lumbar extension.  Instruct for continued performance of all HEP items.  Gait lacking L knee and hip extension and therfore creating increased lumbar flexion- discussed continue hip flexion stretches.       ASSESSMENT/PLAN  Diagnosis: R HS pain, lumbar radiculopathy   Updated problem list and treatment plan:   unknown at this time  STG/LTGs have been met or progress has been made towards goals:  Yes, please see goal flowsheet for most current information  Assessment of Progress: current status is unknown.    Last current status:     Self Management Plans:  HEP  I have re-evaluated this patient and find that the nature, scope, duration and intensity of the therapy is appropriate for the medical condition of the patient.  James continues to require the following intervention to meet STG and LTG's:  HEP.    Recommendations:  Discharge with current home program.  Patient to follow up with MD as needed.    Please refer to the daily flowsheet for treatment today, total  treatment time and time spent performing 1:1 timed codes.

## 2021-08-18 ENCOUNTER — THERAPY VISIT (OUTPATIENT)
Dept: PHYSICAL THERAPY | Facility: CLINIC | Age: 76
End: 2021-08-18
Payer: COMMERCIAL

## 2021-08-18 DIAGNOSIS — G89.29 CHRONIC PAIN OF RIGHT KNEE: ICD-10-CM

## 2021-08-18 DIAGNOSIS — M25.561 CHRONIC PAIN OF RIGHT KNEE: ICD-10-CM

## 2021-08-18 PROCEDURE — 97161 PT EVAL LOW COMPLEX 20 MIN: CPT | Mod: GP | Performed by: PHYSICAL THERAPIST

## 2021-08-18 PROCEDURE — 97112 NEUROMUSCULAR REEDUCATION: CPT | Mod: GP | Performed by: PHYSICAL THERAPIST

## 2021-08-18 PROCEDURE — 97110 THERAPEUTIC EXERCISES: CPT | Mod: GP | Performed by: PHYSICAL THERAPIST

## 2021-08-18 ASSESSMENT — ACTIVITIES OF DAILY LIVING (ADL)
RISE FROM A CHAIR: ACTIVITY IS FAIRLY DIFFICULT
STIFFNESS: THE SYMPTOM AFFECTS MY ACTIVITY SLIGHTLY
LIMPING: THE SYMPTOM AFFECTS MY ACTIVITY SLIGHTLY
GO DOWN STAIRS: ACTIVITY IS FAIRLY DIFFICULT
KNEE_ACTIVITY_OF_DAILY_LIVING_SCORE: 47.14
SQUAT: ACTIVITY IS VERY DIFFICULT
RAW_SCORE: 33
WALK: ACTIVITY IS SOMEWHAT DIFFICULT
GO UP STAIRS: ACTIVITY IS FAIRLY DIFFICULT
GIVING WAY, BUCKLING OR SHIFTING OF KNEE: THE SYMPTOM AFFECTS MY ACTIVITY SLIGHTLY
HOW_WOULD_YOU_RATE_THE_CURRENT_FUNCTION_OF_YOUR_KNEE_DURING_YOUR_USUAL_DAILY_ACTIVITIES_ON_A_SCALE_FROM_0_TO_100_WITH_100_BEING_YOUR_LEVEL_OF_KNEE_FUNCTION_PRIOR_TO_YOUR_INJURY_AND_0_BEING_THE_INABILITY_TO_PERFORM_ANY_OF_YOUR_USUAL_DAILY_ACTIVITIES?: 50
SWELLING: THE SYMPTOM AFFECTS MY ACTIVITY SLIGHTLY
AS_A_RESULT_OF_YOUR_KNEE_INJURY,_HOW_WOULD_YOU_RATE_YOUR_CURRENT_LEVEL_OF_DAILY_ACTIVITY?: ABNORMAL
STAND: ACTIVITY IS SOMEWHAT DIFFICULT
HOW_WOULD_YOU_RATE_THE_OVERALL_FUNCTION_OF_YOUR_KNEE_DURING_YOUR_USUAL_DAILY_ACTIVITIES?: ABNORMAL
KNEE_ACTIVITY_OF_DAILY_LIVING_SUM: 33
KNEEL ON THE FRONT OF YOUR KNEE: I AM UNABLE TO DO THE ACTIVITY
PAIN: THE SYMPTOM AFFECTS MY ACTIVITY SLIGHTLY
WEAKNESS: THE SYMPTOM AFFECTS MY ACTIVITY SLIGHTLY
SIT WITH YOUR KNEE BENT: ACTIVITY IS FAIRLY DIFFICULT

## 2021-08-18 NOTE — LETTER
[facility]  [address] [address]  [place] [facility] [place] [id number] [phone]    2021    Re: James Silvestre   :   1945  MRN:  [id number]   REFERRING PHYSICIAN:   Elmo CRAWFORD [facility]    Date of Initial Evaluation:  2021  Visits:  Rxs Used: 1  Reason for Referral:  Chronic pain of right knee    EVALUATION SUMMARY    Physical Therapy Initial Evaluation  Subjective:  Pt reports that he has been having R knee pain for about 5 years but it's worsened over the past 2 months. He has had multiple injections that have been somewhat helpful but the visco shot wasn't helpful. The most recent cortisone shot on 21 was helpful but it didn't completely take it away. [facility] signficant for L BKA in  after failed vascular bypass surgeries from him polycythema.   The history is provided by the patient. No  was used.   Therapist Generated HPI Evaluation      Type of problem:  Right knee.  This is a chronic condition.  Condition occurred with:  Degenerative joint disease.  Where condition occurred: for unknown reasons.  Patient reports pain:  Anterior.  Pain is described as aching and is intermittent.  Pain radiates to:  Knee. Pain is the same all the time.  Since onset symptoms are gradually improving (since most recent injection).  Associated symptoms:  Loss of motion/stiffness, loss of strength, edema, buckling/giving out and numbness. Symptoms are exacerbated by bending/squatting, ascending stairs, descending stairs, transfers, walking and standing (standing> 5 min and getting up from a chair)  and relieved by rest.  Special tests included:  X-ray.  Barriers include:  Lives alone, stairs and bathroom/bedroom on second floor.    Patient Health History  Pain is reported as 3/10 on pain scale.  Pertinent medical history includes: high blood pressure, overweight, numbness/tingling,  osteoarthritis and smoking (polycythema vera).   Red flags:  None as reported by patient.   Surgeries include:  Orthopedic surgery and other. Other surgery history details: BKA in 2010 and R RCR in 2017.    Current medications:  High blood pressure medication, pain medication and anti-depressants.    Current occupation is retired.    Objective:  System      Knee Evaluation:  ROM:  Strength wnl knee: fair quad set on R.  AROM  Hyperextension: Left:     Right: 0  Extension: Left:    Right:  4  Flexion: Left:   Right: 128  Ligament Testing:  Not Assessed  Special Tests: Not Assessed  Palpation:    Right knee tenderness present at:  Medial Joint Line  Re: James Silvestre   :   1945        Functional Testing:  : pt struggles to go from sit to stand without use of cane, but when placing the cane in front of him and sitting at the edge of the chair and going nose over toes, he was able to stand without using his R knee behind him and torquing the knee. Pain reduced     Assessment/Plan:    Patient is a 76 year old male with right side knee complaints.    Patient has the following significant findings with corresponding treatment plan.                Diagnosis 1:  R knee OA  Pain -  self management, education and home program  Decreased ROM/flexibility - manual therapy and therapeutic exercise  Decreased joint mobility - manual therapy and therapeutic exercise  Decreased strength - therapeutic exercise and therapeutic activities  Impaired gait - gait training  Impaired muscle performance - neuro re-education  Decreased function - therapeutic activities    Therapy Evaluation Codes:     1) Clinical presentation characteristics are:   Stable/Uncomplicated.  2) Decision-Making    Low complexity using standardized patient assessment instrument and/or measureable assessment of functional outcome.  Cumulative Therapy Evaluation is: Low complexity.  Previous and current functional limitations:  (See Goal Flow Sheet for this  information)    Short term and Long term goals: (See Goal Flow Sheet for this information)   Communication ability:  Patient appears to be able to clearly communicate and understand verbal and written communication and follow directions correctly.  Treatment Explanation - The following has been discussed with the patient:   RX ordered/plan of care  Anticipated outcomes  Possible risks and side effects  This patient would benefit from PT intervention to resume normal activities.   Rehab potential is good.  Frequency:  2 X week, once daily for 1 week then 1x/wk for 4 weeks and 1x/month for 1 month  Duration:  for 9 weeks  Discharge Plan:  Achieve all LTG.  Independent in home treatment program.  Reach maximal therapeutic benefit.    Thank you for your referral.      INQUIRIES  Therapist: Kathya Dc DPT, [id number]  [facility] [address]  Phone: [phone]  Fax: [phone]

## 2021-08-18 NOTE — PROGRESS NOTES
Physical Therapy Initial Evaluation  Subjective:  Pt reports that he has been having R knee pain for about 5 years but it's worsened over the past 2 months. He has had multiple injections that have been somewhat helpful but the visco shot wasn't helpful. The most recent cortisone shot on 8-1-21 was helpful but it didn't completely take it away. [facility] signficant for L BKA in 2010 after failed vascular bypass surgeries from him polycythema.     The history is provided by the patient. No  was used.   Therapist Generated HPI Evaluation         Type of problem:  Right knee.    This is a chronic condition.  Condition occurred with:  Degenerative joint disease.  Where condition occurred: for unknown reasons.  Patient reports pain:  Anterior.  Pain is described as aching and is intermittent.  Pain radiates to:  Knee. Pain is the same all the time.  Since onset symptoms are gradually improving (since most recent injection).  Associated symptoms:  Loss of motion/stiffness, loss of strength, edema, buckling/giving out and numbness. Symptoms are exacerbated by bending/squatting, ascending stairs, descending stairs, transfers, walking and standing (standing> 5 min and getting up from a chair)  and relieved by rest.  Special tests included:  X-ray.    Barriers include:  Lives alone, stairs and bathroom/bedroom on second floor.    Patient Health History         Pain is reported as 3/10 on pain scale.    Pertinent medical history includes: high blood pressure, overweight, numbness/tingling, osteoarthritis and smoking (polycythema vera).   Red flags:  None as reported by patient.     Surgeries include:  Orthopedic surgery and other. Other surgery history details: BKA in 2010 and R RCR in 2017.    Current medications:  High blood pressure medication, pain medication and anti-depressants.    Current occupation is retired.                                       Objective:  System                                                 Knee Evaluation:  ROM:  Strength wnl knee: fair quad set on R.  AROM    Hyperextension: Left:     Right: 0  Extension: Left:    Right:  4  Flexion: Left:   Right: 128          Ligament Testing:  Not Assessed                Special Tests: Not Assessed      Palpation:      Right knee tenderness present at:  Medial Joint Line      Functional Testing:  : pt struggles to go from sit to stand without use of cane, but when placing the cane in front of him and sitting at the edge of the chair and going nose over toes, he was able to stand without using his R knee behind him and torquing the knee. Pain reduced                   General     ROS    Assessment/Plan:    Patient is a 76 year old male with right side knee complaints.    Patient has the following significant findings with corresponding treatment plan.                Diagnosis 1:  R knee OA    Pain -  self management, education and home program  Decreased ROM/flexibility - manual therapy and therapeutic exercise  Decreased joint mobility - manual therapy and therapeutic exercise  Decreased strength - therapeutic exercise and therapeutic activities  Impaired gait - gait training  Impaired muscle performance - neuro re-education  Decreased function - therapeutic activities    Therapy Evaluation Codes:     1) Clinical presentation characteristics are:   Stable/Uncomplicated.  2) Decision-Making    Low complexity using standardized patient assessment instrument and/or measureable assessment of functional outcome.  Cumulative Therapy Evaluation is: Low complexity.    Previous and current functional limitations:  (See Goal Flow Sheet for this information)    Short term and Long term goals: (See Goal Flow Sheet for this information)     Communication ability:  Patient appears to be able to clearly communicate and understand verbal and written communication and follow directions correctly.  Treatment Explanation - The following has been discussed with the patient:    RX ordered/plan of care  Anticipated outcomes  Possible risks and side effects  This patient would benefit from PT intervention to resume normal activities.   Rehab potential is good.    Frequency:  2 X week, once daily for 1 week then 1x/wk for 4 weeks and 1x/month for 1 month  Duration:  for 9 weeks  Discharge Plan:  Achieve all LTG.  Independent in home treatment program.  Reach maximal therapeutic benefit.    Please refer to the daily flowsheet for treatment today, total treatment time and time spent performing 1:1 timed codes.

## 2021-08-20 ENCOUNTER — THERAPY VISIT (OUTPATIENT)
Dept: PHYSICAL THERAPY | Facility: CLINIC | Age: 76
End: 2021-08-20
Payer: COMMERCIAL

## 2021-08-20 DIAGNOSIS — M25.561 CHRONIC PAIN OF RIGHT KNEE: ICD-10-CM

## 2021-08-20 DIAGNOSIS — G89.29 CHRONIC PAIN OF RIGHT KNEE: ICD-10-CM

## 2021-08-20 PROCEDURE — 97530 THERAPEUTIC ACTIVITIES: CPT | Mod: GP | Performed by: PHYSICAL THERAPIST

## 2021-08-20 PROCEDURE — 97110 THERAPEUTIC EXERCISES: CPT | Mod: GP | Performed by: PHYSICAL THERAPIST

## 2021-08-25 ENCOUNTER — THERAPY VISIT (OUTPATIENT)
Dept: PHYSICAL THERAPY | Facility: CLINIC | Age: 76
End: 2021-08-25
Payer: COMMERCIAL

## 2021-08-25 DIAGNOSIS — G89.29 CHRONIC PAIN OF RIGHT KNEE: ICD-10-CM

## 2021-08-25 DIAGNOSIS — M25.561 CHRONIC PAIN OF RIGHT KNEE: ICD-10-CM

## 2021-08-25 PROCEDURE — 97530 THERAPEUTIC ACTIVITIES: CPT | Mod: GP | Performed by: PHYSICAL THERAPIST

## 2021-08-25 PROCEDURE — 97110 THERAPEUTIC EXERCISES: CPT | Mod: GP | Performed by: PHYSICAL THERAPIST

## 2021-08-25 NOTE — PROGRESS NOTES
"Subjective:  HPI  Physical Exam                    Objective:  System    Physical Exam    General     ROS    Assessment/Plan:    SUBJECTIVE  Subjective changes as noted by pt:  Pt reports he has been consistent with his HEP and his knee pain is less, however he struggles to perform his sit to stand consistently correct.       Current pain level: 1/10     Changes in function:  Yes (See Goal flowsheet attached for changes in current functional level)     Adverse reaction to treatment or activity:  None    OBJECTIVE  Changes in objective findings:  Yes,     Pt requires continual cuing for proper sit to stand transfers ( feet wide, even and without his R behind him, scoot to the edge of the chair and nose over toes to stand up.)    We tried a variety of different situations, high and low arm rests, no arm rests, 1 arm rest and cane and cane only. All were satisfactory with the exeption of cane only--he twists way too much at his R knee and struggle to get upright and trust his cane. Pt felt comfortable with the strategies provided and now just needs to \"practice\"     He is safest and can negotiate stairs best by shifting weight slightly forward and stepping up reciprocally with 2 railings. To minimize pain and torsion to his R hip and back, it's recommended for him to step 2 feet per step down sideways leading with his R.         ASSESSMENT  James continues to require intervention to meet STG and LTG's: PT  Patient's symptoms are resolving.  Patient is progressing as expected.  Response to therapy has shown an improvement in  strength  Progress made towards STG/LTG?  Yes (See Goal flowsheet attached for updates on achievement of STG and LTG)    PLAN  Current treatment program is being advanced to more complex exercises.    PTA/ATC plan:  N/A    Please refer to the daily flowsheet for treatment today, total treatment time and time spent performing 1:1 timed codes.        "

## 2021-09-02 ENCOUNTER — THERAPY VISIT (OUTPATIENT)
Dept: PHYSICAL THERAPY | Facility: CLINIC | Age: 76
End: 2021-09-02
Payer: COMMERCIAL

## 2021-09-02 DIAGNOSIS — G89.29 CHRONIC PAIN OF RIGHT KNEE: ICD-10-CM

## 2021-09-02 DIAGNOSIS — M25.561 CHRONIC PAIN OF RIGHT KNEE: ICD-10-CM

## 2021-09-02 PROCEDURE — 97530 THERAPEUTIC ACTIVITIES: CPT | Mod: GP | Performed by: PHYSICAL THERAPIST

## 2021-09-02 PROCEDURE — 97110 THERAPEUTIC EXERCISES: CPT | Mod: GP | Performed by: PHYSICAL THERAPIST

## 2021-09-15 ENCOUNTER — THERAPY VISIT (OUTPATIENT)
Dept: PHYSICAL THERAPY | Facility: CLINIC | Age: 76
End: 2021-09-15
Payer: COMMERCIAL

## 2021-09-15 DIAGNOSIS — G89.29 CHRONIC PAIN OF RIGHT KNEE: ICD-10-CM

## 2021-09-15 DIAGNOSIS — M25.561 CHRONIC PAIN OF RIGHT KNEE: ICD-10-CM

## 2021-09-15 PROCEDURE — 97110 THERAPEUTIC EXERCISES: CPT | Mod: GP | Performed by: PHYSICAL THERAPIST

## 2021-09-15 PROCEDURE — 97530 THERAPEUTIC ACTIVITIES: CPT | Mod: GP | Performed by: PHYSICAL THERAPIST

## 2021-09-15 ASSESSMENT — ACTIVITIES OF DAILY LIVING (ADL)
SIT WITH YOUR KNEE BENT: ACTIVITY IS NOT DIFFICULT
AS_A_RESULT_OF_YOUR_KNEE_INJURY,_HOW_WOULD_YOU_RATE_YOUR_CURRENT_LEVEL_OF_DAILY_ACTIVITY?: NEARLY NORMAL
SQUAT: ACTIVITY IS NOT DIFFICULT
RISE FROM A CHAIR: ACTIVITY IS FAIRLY DIFFICULT
KNEEL ON THE FRONT OF YOUR KNEE: ACTIVITY IS MINIMALLY DIFFICULT
LIMPING: THE SYMPTOM AFFECTS MY ACTIVITY MODERATELY
PAIN: THE SYMPTOM AFFECTS MY ACTIVITY SLIGHTLY
WEAKNESS: THE SYMPTOM AFFECTS MY ACTIVITY MODERATELY
KNEE_ACTIVITY_OF_DAILY_LIVING_SCORE: 65.71
RAW_SCORE: 46
HOW_WOULD_YOU_RATE_THE_CURRENT_FUNCTION_OF_YOUR_KNEE_DURING_YOUR_USUAL_DAILY_ACTIVITIES_ON_A_SCALE_FROM_0_TO_100_WITH_100_BEING_YOUR_LEVEL_OF_KNEE_FUNCTION_PRIOR_TO_YOUR_INJURY_AND_0_BEING_THE_INABILITY_TO_PERFORM_ANY_OF_YOUR_USUAL_DAILY_ACTIVITIES?: 80
HOW_WOULD_YOU_RATE_THE_OVERALL_FUNCTION_OF_YOUR_KNEE_DURING_YOUR_USUAL_DAILY_ACTIVITIES?: NEARLY NORMAL
SWELLING: THE SYMPTOM AFFECTS MY ACTIVITY SLIGHTLY
GIVING WAY, BUCKLING OR SHIFTING OF KNEE: THE SYMPTOM AFFECTS MY ACTIVITY SLIGHTLY
STAND: ACTIVITY IS SOMEWHAT DIFFICULT
GO DOWN STAIRS: ACTIVITY IS SOMEWHAT DIFFICULT
GO UP STAIRS: ACTIVITY IS MINIMALLY DIFFICULT
KNEE_ACTIVITY_OF_DAILY_LIVING_SUM: 46
WALK: ACTIVITY IS MINIMALLY DIFFICULT
STIFFNESS: THE SYMPTOM AFFECTS MY ACTIVITY SLIGHTLY

## 2021-09-15 NOTE — PROGRESS NOTES
Subjective:  HPI  Physical Exam       Knee Activity of Daily Living Score: 65.71            Objective:  System    Physical Exam    General     ROS    Assessment/Plan:    DISCHARGE REPORT    Progress reporting period is from 8-18-21 to 9-13-21.       SUBJECTIVE  Subjective changes noted by patient:  Overall pt feels 80% better and has less pain with rising up from a chair and going up/down stairs.         Current pain level is 1/10  .     Previous pain level was  6/10  .   Changes in function:  Yes (See Goal flowsheet attached for changes in current functional level)  Adverse reaction to treatment or activity: None    OBJECTIVE  Changes noted in objective findings:  Yes, R knee AROM: 1-0-138    No pain going up stairs with R after set of 10 repeated ext in standing or sitting        ASSESSMENT/PLAN  Updated problem list and treatment plan: Diagnosis 1:  R knee OA    Pain -  self management, education, directional preference exercise and home program  Decreased strength - therapeutic exercise and therapeutic activities  STG/LTGs have been met or progress has been made towards goals:  Yes (See Goal flow sheet completed today.)  Assessment of Progress: The patient's condition is improving.  The patient's condition has potential to improve.  Patient is meeting short term goals and is progressing towards long term goals.  Self Management Plans:  Patient has been instructed in a home treatment program.  Patient is independent in a home treatment program.  Patient  has been instructed in self management of symptoms.  Patient is independent in self management of symptoms.  I have re-evaluated this patient and find that the nature, scope, duration and intensity of the therapy is appropriate for the medical condition of the patient.  James continues to require the following intervention to meet STG and LTG's:  PT intervention is no longer required to meet STG/LTG.    Recommendations:  This patient is ready to be discharged from  therapy and continue their home treatment program.    Please refer to the daily flowsheet for treatment today, total treatment time and time spent performing 1:1 timed codes.

## 2023-02-03 ENCOUNTER — APPOINTMENT (OUTPATIENT)
Dept: URBAN - METROPOLITAN AREA CLINIC 259 | Age: 78
Setting detail: DERMATOLOGY
End: 2023-02-04

## 2023-02-03 DIAGNOSIS — D49.2 NEOPLASM OF UNSPECIFIED BEHAVIOR OF BONE, SOFT TISSUE, AND SKIN: ICD-10-CM

## 2023-02-03 PROCEDURE — OTHER BIOPSY BY SHAVE METHOD: OTHER

## 2023-02-03 PROCEDURE — OTHER COUNSELING: OTHER

## 2023-02-03 PROCEDURE — 11102 TANGNTL BX SKIN SINGLE LES: CPT

## 2023-02-03 PROCEDURE — OTHER MIPS QUALITY: OTHER

## 2023-02-03 ASSESSMENT — LOCATION SIMPLE DESCRIPTION DERM: LOCATION SIMPLE: LEFT FOREHEAD

## 2023-02-03 ASSESSMENT — LOCATION DETAILED DESCRIPTION DERM: LOCATION DETAILED: LEFT SUPERIOR FOREHEAD

## 2023-02-03 ASSESSMENT — LOCATION ZONE DERM: LOCATION ZONE: FACE

## 2023-02-06 ENCOUNTER — THERAPY VISIT (OUTPATIENT)
Dept: PHYSICAL THERAPY | Facility: CLINIC | Age: 78
End: 2023-02-06
Payer: COMMERCIAL

## 2023-02-06 DIAGNOSIS — M25.511 CHRONIC PAIN OF BOTH SHOULDERS: ICD-10-CM

## 2023-02-06 DIAGNOSIS — M25.512 CHRONIC PAIN OF BOTH SHOULDERS: ICD-10-CM

## 2023-02-06 DIAGNOSIS — G89.29 CHRONIC PAIN OF BOTH SHOULDERS: ICD-10-CM

## 2023-02-06 PROCEDURE — 97161 PT EVAL LOW COMPLEX 20 MIN: CPT | Mod: GP | Performed by: PHYSICAL THERAPIST

## 2023-02-06 PROCEDURE — 97110 THERAPEUTIC EXERCISES: CPT | Mod: GP | Performed by: PHYSICAL THERAPIST

## 2023-02-06 NOTE — PROGRESS NOTES
Physical Therapy Initial Evaluation  Subjective:  Pt reports he has been having shoulder pain for about 8 months, and as it worsened, he went to see Dr. Giles. He has had 3 cortisone injections in the past 8 months, most recently in October of 2022. At his last visit, on 1-31-23, he offered him PT to try to help his shoulders and so he is here today. He states that his R shoulder is worse than his L. PMH: significant for R shoulder scope in 2016 and a recent fall on his R shoulder 2 weeks ago.     The history is provided by the patient. No  was used.   Therapist Generated HPI Evaluation         Type of problem:  Bilateral shoulders.    This is a chronic condition.  Condition occurred with:  Unknown cause.  Where condition occurred: for unknown reasons.  Patient reports pain:  Lateral, upper arm and in the joint.  Pain is described as aching, sharp and shooting and is intermittent.  Pain radiates to:  Shoulder. Pain is worse during the night.  Since onset symptoms are gradually worsening.  Associated symptoms:  Tingling, loss of strength and catching (clicking that is not painful most of the time). Symptoms are exacerbated by using arm behind back and lying on extremity (reaching across body)  and relieved by heat.  Special tests included:  X-ray.    Barriers include:  None as reported by patient.    Patient Health History         Pain is reported as 2/10 on pain scale.  General health as reported by patient is good.  Pertinent medical history includes: numbness/tingling, overweight, high blood pressure and incontinence.   Red flags:  None as reported by patient.  Medical allergies: none.   Surgeries include:  Orthopedic surgery. Other surgery history details: R shoulder scope 2016 and cervical laminectomy in 2013 he thinks.    Current medications:  High blood pressure medication and anti-depressants.    Current occupation is retired.   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                                     Objective:  System                   Shoulder Evaluation:  ROM:  AROM:    Flexion:  Left:  140    Right:  140 with slight painful arc    Abduction:  Left: 140    Right:  140 with painful arc from       External Rotation:  Left:  50    Right:  60            Extension/Internal Rotation:  Left:  L1    Right:  L1                                                     General     ROS    Assessment/Plan:    Patient is a 77 year old male with both sides shoulder complaints.    Patient has the following significant findings with corresponding treatment plan.                Diagnosis 1:  B shoulder pain    Pain -  self management, education, directional preference exercise and home program  Decreased ROM/flexibility - manual therapy and therapeutic exercise  Decreased joint mobility - manual therapy and therapeutic exercise  Decreased strength - therapeutic exercise and therapeutic activities    Therapy Evaluation Codes:       1) Clinical presentation characteristics are:   Stable/Uncomplicated.  2) Decision-Making    Low complexity using standardized patient assessment instrument and/or measureable assessment of functional outcome.  Cumulative Therapy Evaluation is: Low complexity.    Previous and current functional limitations:  (See Goal Flow Sheet for this information)    Short term and Long term goals: (See Goal Flow Sheet for this information)     Communication ability:  Patient appears to be able to clearly communicate and understand verbal and written communication and follow directions correctly.  Treatment Explanation - The following has been discussed with the patient:   RX ordered/plan of care  Anticipated outcomes  Possible risks and side effects  This patient would benefit from PT intervention to resume normal activities.   Rehab potential is good.    Frequency:  1 X week, once daily  Duration:  for 4 weeks then every other week for 8 weeks  Discharge Plan:  Achieve all  LTG.  Independent in home treatment program.  Reach maximal therapeutic benefit.    Please refer to the daily flowsheet for treatment today, total treatment time and time spent performing 1:1 timed codes.

## 2023-02-06 NOTE — PROGRESS NOTES
[facility]    OUTPATIENT Physical Therapy ORTHOPEDIC EVALUATION  PLAN OF TREATMENT FOR OUTPATIENT REHABILITATION  (COMPLETE FOR INITIAL CLAIMS ONLY)  Patient's Last Name, First Name, M.I.  YOB: 1945  James Silvestre    Provider s Name:  [facility]   Medical Record No.  [id number]   Start of Care Date:  02/06/23   Onset Date:    (1/31/23 (MD tabares))   Treatment Diagnosis:  B shoulder pain R>L Medical Diagnosis:  Chronic pain of both shoulders       Goals:     02/06/23 0500   Body Part   Goals listed below are for B shoulders   Goal #1   Goal #1 reaching   Previous Functional Level No restrictions   Current Functional Level Can reach ;to shoulder level;overhead;out to the side;across body   Performance level painful arc 4/10 at    STG Target Performance Reach ;to counter height;to shoulder level;overhead;out to the side   Performance level pain 2/10 or less   Rationale for independent personal hygiene;for dressing;for bathing;for meal preparation;for safe driving, hook seat belt, reach shift lever and turn signal, using both hands on steering wheel   Due date 03/06/23   LTG Target Performance Reach;to counter height;overhead;to shoulder level;out to the side   Performance Level pain free at end range and during AROM   Rationale for independent personal hygiene;for dressing;for bathing;for meal preparation;for safe driving, hook seat belt, reach shift lever and turn signal, using both hands on steering wheel   Due date 05/01/23         Therapy Frequency:     Predicted Duration of Therapy Intervention:       Kathya Morales, DPT                 I CERTIFY THE NEED FOR THESE SERVICES FURNISHED UNDER        THIS PLAN OF TREATMENT AND WHILE UNDER MY CARE .             Physician Signature                Date    X_____________________________________________________                         Certification Date From:  02/06/23   Certification Date To:  05/01/23    Referring Provider:  Artem Giles    Initial Assessment        See Epic Evaluation SOC Date: 02/06/23

## 2023-02-13 ENCOUNTER — THERAPY VISIT (OUTPATIENT)
Dept: PHYSICAL THERAPY | Facility: CLINIC | Age: 78
End: 2023-02-13
Payer: COMMERCIAL

## 2023-02-13 DIAGNOSIS — G89.29 CHRONIC PAIN OF BOTH SHOULDERS: Primary | ICD-10-CM

## 2023-02-13 DIAGNOSIS — M25.511 CHRONIC PAIN OF BOTH SHOULDERS: Primary | ICD-10-CM

## 2023-02-13 DIAGNOSIS — M25.512 CHRONIC PAIN OF BOTH SHOULDERS: Primary | ICD-10-CM

## 2023-02-13 PROCEDURE — 97110 THERAPEUTIC EXERCISES: CPT | Mod: GP | Performed by: PHYSICAL THERAPIST

## 2023-02-13 PROCEDURE — 97112 NEUROMUSCULAR REEDUCATION: CPT | Mod: GP | Performed by: PHYSICAL THERAPIST

## 2023-02-13 NOTE — PROGRESS NOTES
Subjective:  HPI  Physical Exam                    Objective:  System    Physical Exam    General     ROS    Assessment/Plan:    SUBJECTIVE  Subjective changes as noted by pt:  The neck exercise has been helpful--his sleeping is better at night and his pain his more in the top of his R shoulder.          Current pain level: 2/10     Changes in function:  Yes (See Goal flowsheet attached for changes in current functional level)     Adverse reaction to treatment or activity:  None    OBJECTIVE  Changes in objective findings:  Yes,       2/10 pain in R shoulder went away and centralized to the neck after his retraction    ASSESSMENT  James continues to require intervention to meet STG and LTG's: PT  Patient's symptoms are resolving.  Patient is progressing as expected.    Progress made towards STG/LTG?  Yes (See Goal flowsheet attached for updates on achievement of STG and LTG)    PLAN  Current treatment program is being advanced to more complex exercises.    PTA/ATC plan:  N/A    Please refer to the daily flowsheet for treatment today, total treatment time and time spent performing 1:1 timed codes.

## 2023-02-20 ENCOUNTER — APPOINTMENT (OUTPATIENT)
Dept: URBAN - METROPOLITAN AREA CLINIC 259 | Age: 78
Setting detail: DERMATOLOGY
End: 2023-02-20

## 2023-02-20 PROBLEM — C44.319 BASAL CELL CARCINOMA OF SKIN OF OTHER PARTS OF FACE: Status: ACTIVE | Noted: 2023-02-20

## 2023-02-20 PROCEDURE — 17312 MOHS ADDL STAGE: CPT

## 2023-02-20 PROCEDURE — OTHER MOHS SURGERY: OTHER

## 2023-02-20 PROCEDURE — 17311 MOHS 1 STAGE H/N/HF/G: CPT

## 2023-02-20 PROCEDURE — 13132 CMPLX RPR F/C/C/M/N/AX/G/H/F: CPT

## 2023-02-20 PROCEDURE — OTHER MIPS QUALITY: OTHER

## 2023-02-20 NOTE — PROCEDURE: MOHS SURGERY
H&P reviewed. The patient was examined and there are no changes to the H&P.         Estlander Flap (Lower To Upper Lip) Text: The defect of the lower lip was assessed and measured.  Given the location and size of the defect, an Estlander flap was deemed most appropriate.  Using a sterile surgical marker, an appropriate Estlander flap was measured and drawn on the upper lip. Local anesthesia was then infiltrated. A scalpel was then used to incise the lateral aspect of the flap, through skin, muscle and mucosa, leaving the flap pedicled medially.  The flap was then rotated and positioned to fill the lower lip defect.  The flap was then sutured into place with a three layer technique, closing the orbicularis oris muscle layer with subcutaneous buried sutures, followed by a mucosal layer and an epidermal layer.

## 2023-02-20 NOTE — PROCEDURE: MIPS QUALITY
Quality 143: Oncology: Medical And Radiation- Pain Intensity Quantified: Pain severity quantified, no pain present
Detail Level: Detailed
Quality 130: Documentation Of Current Medications In The Medical Record: Current Medications Documented
Quality 110: Preventive Care And Screening: Influenza Immunization: Influenza Immunization previously received during influenza season
Quality 226: Preventive Care And Screening: Tobacco Use: Screening And Cessation Intervention: Patient screened for tobacco use and is an ex/non-smoker
Quality 431: Preventive Care And Screening: Unhealthy Alcohol Use - Screening: Patient not identified as an unhealthy alcohol user when screened for unhealthy alcohol use using a systematic screening method

## 2023-02-24 ENCOUNTER — THERAPY VISIT (OUTPATIENT)
Dept: PHYSICAL THERAPY | Facility: CLINIC | Age: 78
End: 2023-02-24
Payer: COMMERCIAL

## 2023-02-24 DIAGNOSIS — G89.29 CHRONIC PAIN OF BOTH SHOULDERS: Primary | ICD-10-CM

## 2023-02-24 DIAGNOSIS — M25.511 CHRONIC PAIN OF BOTH SHOULDERS: Primary | ICD-10-CM

## 2023-02-24 DIAGNOSIS — M25.512 CHRONIC PAIN OF BOTH SHOULDERS: Primary | ICD-10-CM

## 2023-02-24 PROCEDURE — 97112 NEUROMUSCULAR REEDUCATION: CPT | Mod: GP | Performed by: PHYSICAL THERAPIST

## 2023-02-24 PROCEDURE — 97110 THERAPEUTIC EXERCISES: CPT | Mod: GP | Performed by: PHYSICAL THERAPIST

## 2023-02-24 NOTE — PROGRESS NOTES
Subjective:  HPI  Physical Exam                    Objective:  System    Physical Exam    General     ROS    Assessment/Plan:    SUBJECTIVE  Subjective changes as noted by pt:  He had to have some skin cancer removed on his forehead and also had a to have an eye procedure as well. As a result he didn't do as much with his shoulder this week.        Current pain level: 0/10     Changes in function:  Yes (See Goal flowsheet attached for changes in current functional level)     Adverse reaction to treatment or activity:  None    OBJECTIVE  Changes in objective findings:  Yes, Pt required moderate manual, verbal and visual cuing to maintain proper scapular positioning during exercises today. NMR required for serratus, MT, and LT.      No pain with AROM today to 140/140/T12/55    ASSESSMENT  James continues to require intervention to meet STG and LTG's: PT  Patient's symptoms are resolving.  Patient is progressing as expected.  Response to therapy has shown an improvement in  pain level, ROM  and flexibility  Progress made towards STG/LTG?  Yes (See Goal flowsheet attached for updates on achievement of STG and LTG)    PLAN  Current treatment program is being advanced to more complex exercises.    PTA/ATC plan:  N/A    Please refer to the daily flowsheet for treatment today, total treatment time and time spent performing 1:1 timed codes.

## 2023-02-27 ENCOUNTER — APPOINTMENT (OUTPATIENT)
Dept: URBAN - METROPOLITAN AREA CLINIC 259 | Age: 78
Setting detail: DERMATOLOGY
End: 2023-02-27

## 2023-02-27 DIAGNOSIS — Z48.02 ENCOUNTER FOR REMOVAL OF SUTURES: ICD-10-CM

## 2023-02-27 PROCEDURE — 99024 POSTOP FOLLOW-UP VISIT: CPT

## 2023-02-27 PROCEDURE — OTHER SUTURE REMOVAL (GLOBAL PERIOD): OTHER

## 2023-02-27 ASSESSMENT — LOCATION ZONE DERM: LOCATION ZONE: FACE

## 2023-02-27 ASSESSMENT — LOCATION DETAILED DESCRIPTION DERM: LOCATION DETAILED: LEFT FOREHEAD

## 2023-02-27 ASSESSMENT — LOCATION SIMPLE DESCRIPTION DERM: LOCATION SIMPLE: LEFT FOREHEAD

## 2023-02-27 NOTE — PROCEDURE: SUTURE REMOVAL (GLOBAL PERIOD)
Add 04460 Cpt? (Important Note: In 2017 The Use Of 74255 Is Being Tracked By Cms To Determine Future Global Period Reimbursement For Global Periods): yes
Detail Level: Detailed
Body Location Override (Optional - Billing Will Still Be Based On Selected Body Map Location If Applicable): left superior forehead

## 2023-03-01 ENCOUNTER — THERAPY VISIT (OUTPATIENT)
Dept: PHYSICAL THERAPY | Facility: CLINIC | Age: 78
End: 2023-03-01
Payer: COMMERCIAL

## 2023-03-01 DIAGNOSIS — M25.511 CHRONIC PAIN OF BOTH SHOULDERS: Primary | ICD-10-CM

## 2023-03-01 DIAGNOSIS — M25.512 CHRONIC PAIN OF BOTH SHOULDERS: Primary | ICD-10-CM

## 2023-03-01 DIAGNOSIS — G89.29 CHRONIC PAIN OF BOTH SHOULDERS: Primary | ICD-10-CM

## 2023-03-01 PROCEDURE — 97110 THERAPEUTIC EXERCISES: CPT | Mod: GP | Performed by: PHYSICAL THERAPIST

## 2023-03-01 PROCEDURE — 97112 NEUROMUSCULAR REEDUCATION: CPT | Mod: GP | Performed by: PHYSICAL THERAPIST

## 2023-03-01 NOTE — PROGRESS NOTES
Subjective:  HPI  Physical Exam                    Objective:  System    Physical Exam    General     ROS    Assessment/Plan:    DISCHARGE REPORT    Progress reporting period is from 2-6-23 to 3-1-26.       SUBJECTIVE  Subjective changes noted by patient:  Overall pt is 90% better and doesn't have pain lying on his shoulder, reaching across his body or overhead. He feels like the neck exercises are really helpful.          Current pain level is 1/10  .     Previous pain level was  6/10  .   Changes in function:  Yes (See Goal flowsheet attached for changes in current functional level)  Adverse reaction to treatment or activity: None    OBJECTIVE  Changes noted in objective findings:  Yes,     AROM: 135/135/T10/60 without pain at end range    B shoulder strength: 4+/4/4+/4        ASSESSMENT/PLAN  Updated problem list and treatment plan: Diagnosis 1:  B shoulder    Decreased strength - therapeutic exercise and therapeutic activities  STG/LTGs have been met or progress has been made towards goals:  Yes (See Goal flow sheet completed today.)  Assessment of Progress: The patient's condition is improving.  The patient's condition has potential to improve.  The patient has met all of their long term goals.  Self Management Plans:  Patient has been instructed in a home treatment program.  Patient is independent in a home treatment program.  Patient  has been instructed in self management of symptoms.  Patient is independent in self management of symptoms.  I have re-evaluated this patient and find that the nature, scope, duration and intensity of the therapy is appropriate for the medical condition of the patient.  James continues to require the following intervention to meet STG and LTG's:  PT intervention is no longer required to meet STG/LTG.    Recommendations:  This patient is ready to be discharged from therapy and continue their home treatment program.    Please refer to the daily flowsheet for treatment today, total  treatment time and time spent performing 1:1 timed codes.

## 2023-03-14 NOTE — PROCEDURE: BIOPSY BY SHAVE METHOD
Electrodesiccation Text: The wound bed was treated with electrodesiccation after the biopsy was performed.
Bill [id number] For Specimen Handling/Conveyance To Laboratory?: no
Billing Type: Third-Party Bill
Anesthesia Type: 1% lidocaine with epinephrine
Detail Level: Detailed
Was A Bandage Applied: Yes
Wound Care: Aquaphor
Hemostasis: Drysol
Biopsy Type: H and E
Electrodesiccation And Curettage Text: The wound bed was treated with electrodesiccation and curettage after the biopsy was performed.
Cryotherapy Text: The wound bed was treated with cryotherapy after the biopsy was performed.
Size Of Lesion In Cm: 1.2
Dressing: bandage
Consent: Written consent was obtained and risks were reviewed including but not limited to scarring, infection, bleeding, scabbing, incomplete removal, nerve damage and allergy to anesthesia.
(M6) obeys commands
Information: Selecting Yes will display possible errors in your note based on the variables you have selected. This validation is only offered as a suggestion for you. PLEASE NOTE THAT THE VALIDATION TEXT WILL BE REMOVED WHEN YOU FINALIZE YOUR NOTE. IF YOU WANT TO FAX A PRELIMINARY NOTE YOU WILL NEED TO TOGGLE THIS TO 'NO' IF YOU DO NOT WANT IT IN YOUR FAXED NOTE.
Type Of Destruction Used: Curettage
Silver Nitrate Text: The wound bed was treated with silver nitrate after the biopsy was performed.
Notification Instructions: Patient will be notified of biopsy results. However, patient instructed to call the office if not contacted within 2 weeks.
Depth Of Biopsy: dermis
Biopsy Method: Dermablade
Post-Care Instructions: I reviewed with the patient in detail post-care instructions. Patient is to keep the biopsy site dry overnight, and then apply bacitracin twice daily until healed. Patient may apply hydrogen peroxide soaks to remove any crusting.
Additional Anesthesia Volume In Cc (Will Not Render If 0): 0
Curettage Text: The wound bed was treated with curettage after the biopsy was performed.
Anesthesia Volume In Cc (Will Not Render If 0): 1.5

## 2024-01-04 ENCOUNTER — TRANSCRIBE ORDERS (OUTPATIENT)
Dept: OTHER | Age: 79
End: 2024-01-04

## 2024-01-04 DIAGNOSIS — M76.891 HAMSTRING TENDINITIS OF RIGHT THIGH: Primary | ICD-10-CM

## 2024-01-09 ENCOUNTER — THERAPY VISIT (OUTPATIENT)
Dept: PHYSICAL THERAPY | Facility: CLINIC | Age: 79
End: 2024-01-09
Payer: COMMERCIAL

## 2024-01-09 DIAGNOSIS — M76.891 HAMSTRING TENDINITIS OF RIGHT THIGH: Primary | ICD-10-CM

## 2024-01-09 PROCEDURE — 97161 PT EVAL LOW COMPLEX 20 MIN: CPT | Mod: GP | Performed by: PHYSICAL THERAPIST

## 2024-01-09 PROCEDURE — 97110 THERAPEUTIC EXERCISES: CPT | Mod: GP | Performed by: PHYSICAL THERAPIST

## 2024-01-09 ASSESSMENT — ACTIVITIES OF DAILY LIVING (ADL)
GIVING WAY, BUCKLING OR SHIFTING OF KNEE: I HAVE THE SYMPTOM BUT IT DOES NOT AFFECT MY ACTIVITY
HOW_WOULD_YOU_RATE_THE_CURRENT_FUNCTION_OF_YOUR_KNEE_DURING_YOUR_USUAL_DAILY_ACTIVITIES_ON_A_SCALE_FROM_0_TO_100_WITH_100_BEING_YOUR_LEVEL_OF_KNEE_FUNCTION_PRIOR_TO_YOUR_INJURY_AND_0_BEING_THE_INABILITY_TO_PERFORM_ANY_OF_YOUR_USUAL_DAILY_ACTIVITIES?: 50
KNEE_ACTIVITY_OF_DAILY_LIVING_SUM: 39
WALK: ACTIVITY IS SOMEWHAT DIFFICULT
STAND: ACTIVITY IS SOMEWHAT DIFFICULT
GO DOWN STAIRS: ACTIVITY IS SOMEWHAT DIFFICULT
PAIN: I HAVE THE SYMPTOM BUT IT DOES NOT AFFECT MY ACTIVITY
SQUAT: I AM UNABLE TO DO THE ACTIVITY
KNEE_ACTIVITY_OF_DAILY_LIVING_SCORE: 60
SWELLING: I DO NOT HAVE THE SYMPTOM
KNEEL ON THE FRONT OF YOUR KNEE: ACTIVITY IS VERY DIFFICULT
SIT WITH YOUR KNEE BENT: ACTIVITY IS MINIMALLY DIFFICULT
RAW_SCORE: 42
AS_A_RESULT_OF_YOUR_KNEE_INJURY,_HOW_WOULD_YOU_RATE_YOUR_CURRENT_LEVEL_OF_DAILY_ACTIVITY?: ABNORMAL
STIFFNESS: THE SYMPTOM AFFECTS MY ACTIVITY SLIGHTLY
WEAKNESS: I HAVE THE SYMPTOM BUT IT DOES NOT AFFECT MY ACTIVITY
RISE FROM A CHAIR: ACTIVITY IS SOMEWHAT DIFFICULT
HOW_WOULD_YOU_RATE_THE_OVERALL_FUNCTION_OF_YOUR_KNEE_DURING_YOUR_USUAL_DAILY_ACTIVITIES?: ABNORMAL
GO UP STAIRS: ACTIVITY IS FAIRLY DIFFICULT

## 2024-01-09 NOTE — PROGRESS NOTES
PHYSICAL THERAPY EVALUATION  Type of Visit: Evaluation    See electronic medical record for Abuse and Falls Screening details.    Subjective       Presenting condition or subjective complaint:  Pt reports insidious of R hamstring pain in November 2023. Noticed cramping up when riding his off-road vehicle. It started getting worse to the point that he couldn't stand on it. Ended up seeing MD. Xrays of hip were negative, dx with hamstring tendinitis and referred to PT.   Date of onset: 11/01/24    Relevant medical history: Bladder or bowel problems; High blood pressure; Hearing problems; Overweight   Dates & types of surgery:      Prior diagnostic imaging/testing results:       Prior therapy history for the same diagnosis, illness or injury: No      Prior Level of Function  Transfers:   Ambulation: Assistive equipment  ADL:   IADL:     Living Environment  Social support: Alone   Type of home: House; 2-story   Stairs to enter the home: Yes 2 Is there a railing: No   Ramp: No   Stairs inside the home: Yes 12 Is there a railing: Yes   Help at home: None  Equipment owned: Straight Cane; Walker; Crutches; Grab bars     Employment: No    Hobbies/Interests: deer hunting    Patient goals for therapy: stand up without pain    Pain assessment: See objective evaluation for additional pain details     Objective   KNEE EVALUATION  PAIN: Pain Level with Use: 8/10  Pain Location: R buttock, around gluteal fold  Pain Quality: Aching  Pain Frequency: intermittent  Pain is Worst: depends on activity  Pain is Exacerbated By: moving around at first, sitting somewhat, standing, ascending stairs  Pain is Relieved By: none  Pain Progression: Improved  INTEGUMENTARY (edema, incisions):   POSTURE:   GAIT:  Weightbearing Status:   Assistive Device(s): Cane (single end)  Gait Deviations: Base of support decreased  Rafia decreased  Toe out on L (BKA), lateral trunk sway bilaterally  BALANCE/PROPRIOCEPTION:   WEIGHTBEARING ALIGNMENT:  Lumbar/Pelvic WB Alignment:Lordosis decreased  NON-WEIGHTBEARING ALIGNMENT:   ROM:   (Degrees) Left AROM Left PROM  Right AROM Right PROM   Knee Flexion   124    Knee Extension   -2    Pain:   End feel:     STRENGTH:   Pain: - none + mild ++ moderate +++ severe  Strength Scale: 0-5/5 Left Right   Knee Flexion  5   Knee Extension  5, + (mild)   Quad Set       FLEXIBILITY: Decreased hamstrings R, SLR produces pain proximal hamstring just distal to gluteal fold (slightly distal to typical area of pain)  SPECIAL TESTS:   FUNCTIONAL TESTS:   PALPATION:   JOINT MOBILITY:  lumbar ext min-mod loss, flexion WNL, WNL B SGIS, no lumbar motions affect R hamstring symptoms    Assessment & Plan   CLINICAL IMPRESSIONS  Medical Diagnosis: Hamstring tendinitis of right thigh    Treatment Diagnosis: R hamstring pain   Impression/Assessment: Patient is a 78 year old male with R hamstring complaints.  The following significant findings have been identified: Pain, Decreased ROM/flexibility, Inflammation, Impaired gait, and Decreased activity tolerance. These impairments interfere with their ability to perform self care tasks, recreational activities, household chores, household mobility, and community mobility as compared to previous level of function.     Clinical Decision Making (Complexity):  Clinical Presentation: Stable/Uncomplicated  Clinical Presentation Rationale: based on medical and personal factors listed in PT evaluation  Clinical Decision Making (Complexity): Low complexity    PLAN OF CARE  Treatment Interventions:  Interventions: Manual Therapy, Neuromuscular Re-education, Therapeutic Activity, Therapeutic Exercise    Long Term Goals     PT Goal 1  Goal Identifier: standing  Goal Description: Pt will be able to stand for 15 minutes without pain in R hamstring  Rationale: to maximize safety and independence with performance of ADLs and functional tasks;to maximize safety and independence within the home;to maximize safety  and independence within the community;to maximize safety and independence with self cares  Target Date: 03/19/24      Frequency of Treatment: 1x/wk  Duration of Treatment: 10 wks    Recommended Referrals to Other Professionals:   Education Assessment:        Risks and benefits of evaluation/treatment have been explained.   Patient/Family/caregiver agrees with Plan of Care.     Evaluation Time:     PT Eval, Low Complexity Minutes (92562): 24       Signing Clinician: Jono Benítez PT      [facility]                                                                                   OUTPATIENT PHYSICAL THERAPY      PLAN OF TREATMENT FOR OUTPATIENT REHABILITATION   Patient's Last Name, First Name, James Viveros YOB: 1945   Provider's Name   [facility]   Medical Record No.  [id number]     Onset Date: 11/01/24  Start of Care Date: 01/09/24     Medical Diagnosis:  Hamstring tendinitis of right thigh      PT Treatment Diagnosis:  R hamstring pain Plan of Treatment  Frequency/Duration: 1x/wk/ 10 wks    Certification date from 01/09/24 to 03/19/24         See note for plan of treatment details and functional goals     Jono Benítez, PT                         I CERTIFY THE NEED FOR THESE SERVICES FURNISHED UNDER        THIS PLAN OF TREATMENT AND WHILE UNDER MY CARE .             Physician Signature               Date    X_____________________________________________________                  Referring Provider:  Artem Giles    Initial Assessment  See Epic Evaluation- Start of Care Date: 01/09/24

## 2024-01-12 ENCOUNTER — THERAPY VISIT (OUTPATIENT)
Dept: PHYSICAL THERAPY | Facility: CLINIC | Age: 79
End: 2024-01-12
Payer: COMMERCIAL

## 2024-01-12 DIAGNOSIS — M76.891 HAMSTRING TENDINITIS OF RIGHT THIGH: Primary | ICD-10-CM

## 2024-01-12 PROCEDURE — 97110 THERAPEUTIC EXERCISES: CPT | Mod: GP | Performed by: PHYSICAL THERAPIST

## 2024-01-26 ENCOUNTER — THERAPY VISIT (OUTPATIENT)
Dept: PHYSICAL THERAPY | Facility: CLINIC | Age: 79
End: 2024-01-26
Payer: COMMERCIAL

## 2024-01-26 DIAGNOSIS — M76.891 HAMSTRING TENDINITIS OF RIGHT THIGH: Primary | ICD-10-CM

## 2024-01-26 PROCEDURE — 97110 THERAPEUTIC EXERCISES: CPT | Mod: GP | Performed by: PHYSICAL THERAPIST

## 2024-01-30 ENCOUNTER — THERAPY VISIT (OUTPATIENT)
Dept: PHYSICAL THERAPY | Facility: CLINIC | Age: 79
End: 2024-01-30
Payer: COMMERCIAL

## 2024-01-30 DIAGNOSIS — M76.891 HAMSTRING TENDINITIS OF RIGHT THIGH: Primary | ICD-10-CM

## 2024-01-30 PROCEDURE — 97110 THERAPEUTIC EXERCISES: CPT | Mod: GP | Performed by: PHYSICAL THERAPIST

## 2024-02-06 ENCOUNTER — THERAPY VISIT (OUTPATIENT)
Dept: PHYSICAL THERAPY | Facility: CLINIC | Age: 79
End: 2024-02-06
Payer: COMMERCIAL

## 2024-02-06 DIAGNOSIS — M76.891 HAMSTRING TENDINITIS OF RIGHT THIGH: Primary | ICD-10-CM

## 2024-02-06 PROCEDURE — 97530 THERAPEUTIC ACTIVITIES: CPT | Mod: GP | Performed by: PHYSICAL THERAPIST

## 2024-02-06 PROCEDURE — 97110 THERAPEUTIC EXERCISES: CPT | Mod: GP | Performed by: PHYSICAL THERAPIST

## 2024-02-13 ENCOUNTER — THERAPY VISIT (OUTPATIENT)
Dept: PHYSICAL THERAPY | Facility: CLINIC | Age: 79
End: 2024-02-13
Payer: COMMERCIAL

## 2024-02-13 DIAGNOSIS — M76.891 HAMSTRING TENDINITIS OF RIGHT THIGH: Primary | ICD-10-CM

## 2024-02-13 PROCEDURE — 97530 THERAPEUTIC ACTIVITIES: CPT | Mod: GP | Performed by: PHYSICAL THERAPIST

## 2024-02-13 PROCEDURE — 97110 THERAPEUTIC EXERCISES: CPT | Mod: GP | Performed by: PHYSICAL THERAPIST

## 2024-02-13 ASSESSMENT — ACTIVITIES OF DAILY LIVING (ADL)
GO UP STAIRS: ACTIVITY IS FAIRLY DIFFICULT
KNEEL ON THE FRONT OF YOUR KNEE: ACTIVITY IS MINIMALLY DIFFICULT
GIVING WAY, BUCKLING OR SHIFTING OF KNEE: THE SYMPTOM AFFECTS MY ACTIVITY MODERATELY
GO DOWN STAIRS: ACTIVITY IS FAIRLY DIFFICULT
SIT WITH YOUR KNEE BENT: ACTIVITY IS MINIMALLY DIFFICULT
STAND: ACTIVITY IS SOMEWHAT DIFFICULT
KNEE_ACTIVITY_OF_DAILY_LIVING_SCORE: 54.29
PAIN: I HAVE THE SYMPTOM BUT IT DOES NOT AFFECT MY ACTIVITY
SQUAT: ACTIVITY IS VERY DIFFICULT
KNEE_ACTIVITY_OF_DAILY_LIVING_SUM: 38
SWELLING: THE SYMPTOM AFFECTS MY ACTIVITY SEVERELY
RISE FROM A CHAIR: ACTIVITY IS MINIMALLY DIFFICULT
WEAKNESS: THE SYMPTOM AFFECTS MY ACTIVITY MODERATELY
WALK: ACTIVITY IS FAIRLY DIFFICULT
STIFFNESS: I HAVE THE SYMPTOM BUT IT DOES NOT AFFECT MY ACTIVITY
LIMPING: THE SYMPTOM AFFECTS MY ACTIVITY SLIGHTLY
RAW_SCORE: 38

## 2024-02-13 NOTE — PROGRESS NOTES
DISCHARGE  Reason for Discharge: Patient has met all goals.    Equipment Issued:     Discharge Plan: Patient to continue home program.   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   Quick Adds Certification   Progress Note/Certification   Start of Care Date 01/09/24   Onset of illness/injury or Date of Surgery 11/01/24   Therapy Frequency 1x/wk   Predicted Duration 10 wks   Certification date from 01/09/24   Certification date to 03/19/24   Progress Note Completed Date 01/09/24   PT Goal 1   Goal Identifier standing   Goal Description Pt will be able to stand for 15 minutes without pain in R hamstring   Rationale to maximize safety and independence with performance of ADLs and functional tasks;to maximize safety and independence within the home;to maximize safety and independence within the community;to maximize safety and independence with self cares   Goal Progress 15 min as of 2/13 without increased pain   Target Date 03/19/24   Date Met 02/13/24   Subjective Report   Subjective Report Performing the lumbar EIS 3-5x/day. As a result, having less and less of the pain. Over 70% improved.   Objective Measures   Objective Measures Objective Measure 2   Objective Measure 1   Objective Measure lumbar AROM   Details min loss extension   Objective Measure 2   Objective Measure R hamstring strength   Details 5/5 with no pain, denies pain sit to stand   Treatment Interventions (PT)   Interventions Manual Therapy;Therapeutic Procedure/Exercise;Neuromuscular Re-education;Therapeutic Activity   Therapeutic Procedure/Exercise   Therapeutic Procedures: strength, endurance, ROM, flexibillity minutes (97343) 18   PTRx Ther Proc 1 Standing Extension   PTRx Ther  Proc 1 - Details x10 for rev   PTRx Ther Proc 2 Standing Hamstring Stretch   PTRx Ther Proc 2 - Details verbal review   PTRx Ther Proc 3 Bridging #1   PTRx Ther Proc 3 - Details x18 denies L leg pain   PTRx Ther Proc 4 Sit to Stand   PTRx Ther Proc 4 - Details x15 reports that he gets lightheaded with a lot of exercise abolishes quickly when sits   PTRx Ther Proc 5 Seated Hamstring Curl with Tubing   PTRx Ther Proc 5 - Details BTB x 20   Patient Response/Progress see above   Therapeutic Activity   Therapeutic Activities: dynamic activities to improve functional performance minutes (74473) 8   Ther Act 1 8' TA pt demonstrates understnading of continued importance of exercise dosage, expectations for change, long-term self management vs cure, continued use of HEP   Patient Response/Progress see above   Neuromuscular Re-education   PTRx Neuro Re-ed 1 Posture Correction with Lumbar Roll   PTRx Neuro Re-ed 1 - Details HEP   Patient Response/Progress see above   Plan   Home program see PTRx   Updates to plan of care education   Plan for next session d/c to HEP as of 2/13/24   Total Session Time   Timed Code Treatment Minutes 26   Total Treatment Time (sum of timed and untimed services) 26         Referring Provider:  Artem Giles